# Patient Record
Sex: MALE | Race: WHITE | NOT HISPANIC OR LATINO | URBAN - METROPOLITAN AREA
[De-identification: names, ages, dates, MRNs, and addresses within clinical notes are randomized per-mention and may not be internally consistent; named-entity substitution may affect disease eponyms.]

---

## 2021-04-13 ENCOUNTER — HOSPITAL ENCOUNTER (OUTPATIENT)
Dept: VACCINE CLINIC | Facility: HOSPITAL | Age: 31
Discharge: HOME | End: 2021-04-13
Attending: INTERNAL MEDICINE

## 2021-05-04 ENCOUNTER — HOSPITAL ENCOUNTER (OUTPATIENT)
Dept: VACCINE CLINIC | Facility: HOSPITAL | Age: 31
Discharge: HOME | End: 2021-05-04
Attending: INTERNAL MEDICINE

## 2023-04-13 ENCOUNTER — OFFICE VISIT (OUTPATIENT)
Dept: FAMILY MEDICINE CLINIC | Facility: CLINIC | Age: 33
End: 2023-04-13
Payer: COMMERCIAL

## 2023-04-13 VITALS
DIASTOLIC BLOOD PRESSURE: 86 MMHG | HEIGHT: 67 IN | OXYGEN SATURATION: 99 % | BODY MASS INDEX: 32.02 KG/M2 | TEMPERATURE: 97.8 F | HEART RATE: 87 BPM | WEIGHT: 204 LBS | SYSTOLIC BLOOD PRESSURE: 131 MMHG

## 2023-04-13 DIAGNOSIS — Z11.59 NEED FOR HEPATITIS C SCREENING TEST: ICD-10-CM

## 2023-04-13 DIAGNOSIS — Z00.00 ENCOUNTER FOR MEDICAL EXAMINATION TO ESTABLISH CARE: Primary | ICD-10-CM

## 2023-04-13 DIAGNOSIS — F98.8 ATTENTION DEFICIT DISORDER (ADD) WITHOUT HYPERACTIVITY: ICD-10-CM

## 2023-04-13 LAB
ALBUMIN SERPL-MCNC: 5.3 G/DL (ref 3.5–5.2)
ALBUMIN/GLOB SERPL: 2.2 G/DL
ALP SERPL-CCNC: 52 U/L (ref 39–117)
ALT SERPL W P-5'-P-CCNC: 19 U/L (ref 1–41)
AMPHET+METHAMPHET UR QL: NEGATIVE
AMPHETAMINE INTERNAL CONTROL: ABNORMAL
AMPHETAMINES UR QL: NEGATIVE
ANION GAP SERPL CALCULATED.3IONS-SCNC: 10 MMOL/L (ref 5–15)
AST SERPL-CCNC: 16 U/L (ref 1–40)
BARBITURATE INTERNAL CONTROL: ABNORMAL
BARBITURATES UR QL SCN: NEGATIVE
BASOPHILS # BLD AUTO: 0.05 10*3/MM3 (ref 0–0.2)
BASOPHILS NFR BLD AUTO: 0.9 % (ref 0–1.5)
BENZODIAZ UR QL SCN: NEGATIVE
BENZODIAZEPINE INTERNAL CONTROL: ABNORMAL
BILIRUB SERPL-MCNC: 0.9 MG/DL (ref 0–1.2)
BUN SERPL-MCNC: 20 MG/DL (ref 6–20)
BUN/CREAT SERPL: 22 (ref 7–25)
BUPRENORPHINE INTERNAL CONTROL: ABNORMAL
BUPRENORPHINE SERPL-MCNC: NEGATIVE NG/ML
CALCIUM SPEC-SCNC: 10.1 MG/DL (ref 8.6–10.5)
CANNABINOIDS SERPL QL: POSITIVE
CHLORIDE SERPL-SCNC: 104 MMOL/L (ref 98–107)
CHOLEST SERPL-MCNC: 173 MG/DL (ref 0–200)
CO2 SERPL-SCNC: 27 MMOL/L (ref 22–29)
COCAINE INTERNAL CONTROL: ABNORMAL
COCAINE UR QL: NEGATIVE
CREAT SERPL-MCNC: 0.91 MG/DL (ref 0.76–1.27)
DEPRECATED RDW RBC AUTO: 35.9 FL (ref 37–54)
EGFRCR SERPLBLD CKD-EPI 2021: 113.4 ML/MIN/1.73
EOSINOPHIL # BLD AUTO: 0.11 10*3/MM3 (ref 0–0.4)
EOSINOPHIL NFR BLD AUTO: 2.1 % (ref 0.3–6.2)
ERYTHROCYTE [DISTWIDTH] IN BLOOD BY AUTOMATED COUNT: 12.2 % (ref 12.3–15.4)
EXPIRATION DATE: ABNORMAL
GLOBULIN UR ELPH-MCNC: 2.4 GM/DL
GLUCOSE SERPL-MCNC: 93 MG/DL (ref 65–99)
HCT VFR BLD AUTO: 43.2 % (ref 37.5–51)
HCV AB SER DONR QL: NORMAL
HDLC SERPL-MCNC: 47 MG/DL (ref 40–60)
HGB BLD-MCNC: 15.4 G/DL (ref 13–17.7)
IMM GRANULOCYTES # BLD AUTO: 0.01 10*3/MM3 (ref 0–0.05)
IMM GRANULOCYTES NFR BLD AUTO: 0.2 % (ref 0–0.5)
LDLC SERPL CALC-MCNC: 116 MG/DL (ref 0–100)
LDLC/HDLC SERPL: 2.46 {RATIO}
LYMPHOCYTES # BLD AUTO: 2.15 10*3/MM3 (ref 0.7–3.1)
LYMPHOCYTES NFR BLD AUTO: 40.7 % (ref 19.6–45.3)
Lab: ABNORMAL
MCH RBC QN AUTO: 29.1 PG (ref 26.6–33)
MCHC RBC AUTO-ENTMCNC: 35.6 G/DL (ref 31.5–35.7)
MCV RBC AUTO: 81.5 FL (ref 79–97)
MDMA (ECSTASY) INTERNAL CONTROL: ABNORMAL
MDMA UR QL SCN: NEGATIVE
METHADONE INTERNAL CONTROL: ABNORMAL
METHADONE UR QL SCN: NEGATIVE
METHAMPHETAMINE INTERNAL CONTROL: ABNORMAL
MONOCYTES # BLD AUTO: 0.38 10*3/MM3 (ref 0.1–0.9)
MONOCYTES NFR BLD AUTO: 7.2 % (ref 5–12)
NEUTROPHILS NFR BLD AUTO: 2.58 10*3/MM3 (ref 1.7–7)
NEUTROPHILS NFR BLD AUTO: 48.9 % (ref 42.7–76)
NRBC BLD AUTO-RTO: 0 /100 WBC (ref 0–0.2)
OPIATES INTERNAL CONTROL: ABNORMAL
OPIATES UR QL: NEGATIVE
OXYCODONE INTERNAL CONTROL: ABNORMAL
OXYCODONE UR QL SCN: NEGATIVE
PCP UR QL SCN: NEGATIVE
PHENCYCLIDINE INTERNAL CONTROL: ABNORMAL
PLATELET # BLD AUTO: 212 10*3/MM3 (ref 140–450)
PMV BLD AUTO: 10.3 FL (ref 6–12)
POTASSIUM SERPL-SCNC: 4.3 MMOL/L (ref 3.5–5.2)
PROT SERPL-MCNC: 7.7 G/DL (ref 6–8.5)
RBC # BLD AUTO: 5.3 10*6/MM3 (ref 4.14–5.8)
SODIUM SERPL-SCNC: 141 MMOL/L (ref 136–145)
THC INTERNAL CONTROL: ABNORMAL
TRIGL SERPL-MCNC: 51 MG/DL (ref 0–150)
TSH SERPL DL<=0.05 MIU/L-ACNC: 1.16 UIU/ML (ref 0.27–4.2)
VLDLC SERPL-MCNC: 10 MG/DL (ref 5–40)
WBC NRBC COR # BLD: 5.28 10*3/MM3 (ref 3.4–10.8)

## 2023-04-13 PROCEDURE — 85025 COMPLETE CBC W/AUTO DIFF WBC: CPT | Performed by: FAMILY MEDICINE

## 2023-04-13 PROCEDURE — 80053 COMPREHEN METABOLIC PANEL: CPT | Performed by: FAMILY MEDICINE

## 2023-04-13 PROCEDURE — 86803 HEPATITIS C AB TEST: CPT | Performed by: FAMILY MEDICINE

## 2023-04-13 PROCEDURE — 84443 ASSAY THYROID STIM HORMONE: CPT | Performed by: FAMILY MEDICINE

## 2023-04-13 PROCEDURE — 80061 LIPID PANEL: CPT | Performed by: FAMILY MEDICINE

## 2023-04-13 NOTE — ASSESSMENT & PLAN NOTE
He has multiple signs and symptoms of ADD.  Even the symptoms were Clarence go back to his high school days.  Unfortunately they have now persisted into adulthood.  We will start with a trial of medication.

## 2023-04-13 NOTE — PROGRESS NOTES
Chief Complaint   Patient presents with   • Establish Care        Subjective     David Hernandez  has no past medical history on file.    Establish care- he is an otherwise healthy young man without any chronic persistent medical problems.  He did is concerned about having attention deficit disorder.  Even going back to high school he had difficulties with focus and concentration.  Today he struggles with initiating and finishing projects.  He is describes himself as being an organized and frequently loses or misplaces things.      PHQ-2 Depression Screening  Little interest or pleasure in doing things?     Feeling down, depressed, or hopeless?     PHQ-2 Total Score     PHQ-9 Depression Screening  Little interest or pleasure in doing things?     Feeling down, depressed, or hopeless?     Trouble falling or staying asleep, or sleeping too much?     Feeling tired or having little energy?     Poor appetite or overeating?     Feeling bad about yourself - or that you are a failure or have let yourself or your family down?     Trouble concentrating on things, such as reading the newspaper or watching television?     Moving or speaking so slowly that other people could have noticed? Or the opposite - being so fidgety or restless that you have been moving around a lot more than usual?     Thoughts that you would be better off dead, or of hurting yourself in some way?     PHQ-9 Total Score     If you checked off any problems, how difficult have these problems made it for you to do your work, take care of things at home, or get along with other people?       No Known Allergies    Prior to Admission medications    Not on File        Patient Active Problem List   Diagnosis   • Encounter for medical examination to establish care   • Attention deficit disorder (ADD) without hyperactivity        Past Surgical History:   Procedure Laterality Date   • CLAVICLE SURGERY     • ELBOW PROCEDURE Right    • NASAL SEPTUM SURGERY Bilateral   "      Social History     Socioeconomic History   • Marital status:    Tobacco Use   • Smoking status: Former     Types: Cigarettes   • Smokeless tobacco: Never   Vaping Use   • Vaping Use: Never used       Family History   Problem Relation Age of Onset   • Cancer Mother    • Diabetes Father    • Cancer Father    • Cancer Maternal Grandfather    • Cancer Paternal Grandfather        Family history, surgical history, past medical history, Allergies and meds reviewed with patient today and updated in [facility] EMR.     ROS:  Review of Systems   Constitutional: Negative for fatigue.   HENT: Negative for congestion, postnasal drip and rhinorrhea.    Eyes: Negative for blurred vision and visual disturbance.   Respiratory: Negative for cough, chest tightness, shortness of breath and wheezing.    Cardiovascular: Negative for chest pain and palpitations.   Gastrointestinal: Negative for constipation and diarrhea.   Allergic/Immunologic: Negative for environmental allergies.   Neurological: Negative for headache.   Psychiatric/Behavioral: Positive for decreased concentration and stress. Negative for depressed mood. The patient is not nervous/anxious.        OBJECTIVE:  Vitals:    04/13/23 1002   BP: 131/86   BP Location: Left arm   Patient Position: Sitting   Pulse: 87   Temp: 97.8 °F (36.6 °C)   SpO2: 99%   Weight: 92.5 kg (204 lb)   Height: 170.2 cm (67\")     No results found.   Body mass index is 31.95 kg/m².  No LMP for male patient.    Physical Exam  Vitals and nursing note reviewed.   Constitutional:       General: He is not in acute distress.     Appearance: Normal appearance. He is normal weight.   HENT:      Head: Normocephalic.      Right Ear: Tympanic membrane, ear canal and external ear normal.      Left Ear: Tympanic membrane, ear canal and external ear normal.      Nose: Nose normal.      Mouth/Throat:      Mouth: Mucous membranes are moist.      Pharynx: Oropharynx is clear.   Eyes:      General: No scleral " icterus.     Conjunctiva/sclera: Conjunctivae normal.      Pupils: Pupils are equal, round, and reactive to light.   Cardiovascular:      Rate and Rhythm: Normal rate and regular rhythm.      Pulses: Normal pulses.      Heart sounds: Normal heart sounds. No murmur heard.  Pulmonary:      Effort: Pulmonary effort is normal.      Breath sounds: Normal breath sounds. No wheezing, rhonchi or rales.   Musculoskeletal:      Cervical back: Neck supple. No rigidity or tenderness.   Lymphadenopathy:      Cervical: No cervical adenopathy.   Skin:     General: Skin is warm and dry.      Coloration: Skin is not jaundiced.      Findings: No rash.   Neurological:      General: No focal deficit present.      Mental Status: He is alert and oriented to person, place, and time.   Psychiatric:         Mood and Affect: Mood normal.         Thought Content: Thought content normal.         Judgment: Judgment normal.         Procedures    No visits with results within 30 Day(s) from this visit.   Latest known visit with results is:   No results found for any previous visit.       ASSESSMENT/ PLAN:    Diagnoses and all orders for this visit:    1. Encounter for medical examination to establish care (Primary)  Assessment & Plan:  He presents today to establish care.  He is otherwise healthy.  We will just get some routine labs.    Orders:  -     Comprehensive Metabolic Panel  -     CBC & Differential  -     TSH  -     Lipid Panel    2. Attention deficit disorder (ADD) without hyperactivity  Assessment & Plan:  He has multiple signs and symptoms of ADD.  Even the symptoms were Clarence go back to his high school days.  Unfortunately they have now persisted into adulthood.  We will start with a trial of medication.    Orders:  -     POC Urine Drug Screen [facility]  -     lisdexamfetamine (Vyvanse) 20 MG capsule; Take 1 capsule by mouth Every Morning for 30 days  Dispense: 30 capsule; Refill: 0      Orders Placed Today:     New Medications  Ordered This Visit   Medications   • lisdexamfetamine (Vyvanse) 20 MG capsule     Sig: Take 1 capsule by mouth Every Morning for 30 days     Dispense:  30 capsule     Refill:  0        Management Plan:     An After Visit Summary was printed and given to the patient at discharge.    Follow-up: Return in about 4 weeks (around 5/11/2023) for Recheck.    Rolo Vance DO 4/13/2023 11:23 EDT  This note was electronically signed.

## 2023-04-13 NOTE — ASSESSMENT & PLAN NOTE
He presents today to establish care.  He is otherwise healthy.  We will just get some routine labs.

## 2023-04-19 ENCOUNTER — PATIENT ROUNDING (BHMG ONLY) (OUTPATIENT)
Dept: FAMILY MEDICINE CLINIC | Facility: CLINIC | Age: 33
End: 2023-04-19
Payer: COMMERCIAL

## 2023-04-19 NOTE — PROGRESS NOTES
A [employer] MESSAGE HAS BEEN SENT TO THE PATIENT FOR PATIENT ROUNDING WITH [facility] [place]

## 2023-05-16 ENCOUNTER — OFFICE VISIT (OUTPATIENT)
Dept: FAMILY MEDICINE CLINIC | Facility: CLINIC | Age: 33
End: 2023-05-16
Payer: COMMERCIAL

## 2023-05-16 VITALS
WEIGHT: 209 LBS | HEIGHT: 67 IN | HEART RATE: 78 BPM | TEMPERATURE: 97.5 F | SYSTOLIC BLOOD PRESSURE: 136 MMHG | DIASTOLIC BLOOD PRESSURE: 87 MMHG | BODY MASS INDEX: 32.8 KG/M2 | OXYGEN SATURATION: 98 %

## 2023-05-16 DIAGNOSIS — F98.8 ATTENTION DEFICIT DISORDER (ADD) WITHOUT HYPERACTIVITY: Primary | ICD-10-CM

## 2023-05-16 PROCEDURE — 99213 OFFICE O/P EST LOW 20 MIN: CPT | Performed by: FAMILY MEDICINE

## 2023-05-16 NOTE — ASSESSMENT & PLAN NOTE
He is doing somewhat better but still feels there is a lot more room for improvement.  We will increase his Vyvanse up further.

## 2023-05-16 NOTE — PROGRESS NOTES
Chief Complaint   Patient presents with   • Follow-up     1 month    • ADD        Subjective     David Hernandez  has no past medical history on file.    ADD-he has noticed some improvement with the Vyvanse on a daily basis with his focus attention and concentration.  He states it was more noticeable the first couple weeks but seem to have waned the last couple weeks.  Initially had some difficulty sleeping but that has since resolved.  Also he noticed a decrease in his appetite the first couple weeks but that seemed to have resolved as well.      PHQ-2 Depression Screening  Little interest or pleasure in doing things?     Feeling down, depressed, or hopeless?     PHQ-2 Total Score     PHQ-9 Depression Screening  Little interest or pleasure in doing things?     Feeling down, depressed, or hopeless?     Trouble falling or staying asleep, or sleeping too much?     Feeling tired or having little energy?     Poor appetite or overeating?     Feeling bad about yourself - or that you are a failure or have let yourself or your family down?     Trouble concentrating on things, such as reading the newspaper or watching television?     Moving or speaking so slowly that other people could have noticed? Or the opposite - being so fidgety or restless that you have been moving around a lot more than usual?     Thoughts that you would be better off dead, or of hurting yourself in some way?     PHQ-9 Total Score     If you checked off any problems, how difficult have these problems made it for you to do your work, take care of things at home, or get along with other people?       No Known Allergies    Prior to Admission medications    Medication Sig Start Date End Date Taking? Authorizing Provider   lisdexamfetamine (Vyvanse) 20 MG capsule Take 1 capsule by mouth Every Morning for 30 days 4/13/23 5/16/23 Yes Rolo Vance, DO        Patient Active Problem List   Diagnosis   • Encounter for medical examination to establish  "care   • Attention deficit disorder (ADD) without hyperactivity        Past Surgical History:   Procedure Laterality Date   • CLAVICLE SURGERY     • ELBOW PROCEDURE Right    • NASAL SEPTUM SURGERY Bilateral        Social History     Socioeconomic History   • Marital status:    Tobacco Use   • Smoking status: Former     Types: Cigarettes   • Smokeless tobacco: Never   Vaping Use   • Vaping Use: Never used       Family History   Problem Relation Age of Onset   • Cancer Mother    • Diabetes Father    • Cancer Father    • Cancer Maternal Grandfather    • Cancer Paternal Grandfather        Family history, surgical history, past medical history, Allergies and meds reviewed with patient today and updated in [facility] EMR.     ROS:  Review of Systems   Constitutional: Negative for appetite change and fatigue.   Neurological: Negative for tremors.   Psychiatric/Behavioral: Positive for decreased concentration. The patient is not nervous/anxious.        OBJECTIVE:  Vitals:    05/16/23 0818   BP: 136/87   BP Location: Left arm   Patient Position: Sitting   Pulse: 78   Temp: 97.5 °F (36.4 °C)   SpO2: 98%   Weight: 94.8 kg (209 lb)   Height: 170.2 cm (67\")     No results found.   Body mass index is 32.73 kg/m².  No LMP for male patient.    Physical Exam  Vitals and nursing note reviewed.   Constitutional:       General: He is not in acute distress.     Appearance: Normal appearance. He is normal weight.   HENT:      Head: Normocephalic.   Cardiovascular:      Rate and Rhythm: Normal rate and regular rhythm.      Heart sounds: Normal heart sounds. No murmur heard.  Pulmonary:      Effort: Pulmonary effort is normal.      Breath sounds: Normal breath sounds. No wheezing.   Neurological:      Mental Status: He is alert and oriented to person, place, and time.   Psychiatric:         Mood and Affect: Mood normal.         Behavior: Behavior normal.         Thought Content: Thought content normal.         Judgment: Judgment normal. "         Procedures    No visits with results within 30 Day(s) from this visit.   Latest known visit with results is:   Office Visit on 04/13/2023   Component Date Value Ref Range Status   • Glucose 04/13/2023 93  65 - 99 mg/dL Final   • BUN 04/13/2023 20  6 - 20 mg/dL Final   • Creatinine 04/13/2023 0.91  0.76 - 1.27 mg/dL Final   • Sodium 04/13/2023 141  136 - 145 mmol/L Final   • Potassium 04/13/2023 4.3  3.5 - 5.2 mmol/L Final   • Chloride 04/13/2023 104  98 - 107 mmol/L Final   • CO2 04/13/2023 27.0  22.0 - 29.0 mmol/L Final   • Calcium 04/13/2023 10.1  8.6 - 10.5 mg/dL Final   • Total Protein 04/13/2023 7.7  6.0 - 8.5 g/dL Final   • Albumin 04/13/2023 5.3 (H)  3.5 - 5.2 g/dL Final   • ALT (SGPT) 04/13/2023 19  1 - 41 U/L Final   • AST (SGOT) 04/13/2023 16  1 - 40 U/L Final   • Alkaline Phosphatase 04/13/2023 52  39 - 117 U/L Final   • Total Bilirubin 04/13/2023 0.9  0.0 - 1.2 mg/dL Final   • Globulin 04/13/2023 2.4  gm/dL Final   • A/G Ratio 04/13/2023 2.2  g/dL Final   • BUN/Creatinine Ratio 04/13/2023 22.0  7.0 - 25.0 Final   • Anion Gap 04/13/2023 10.0  5.0 - 15.0 mmol/L Final   • eGFR 04/13/2023 113.4  >60.0 mL/min/1.73 Final   • TSH 04/13/2023 1.160  0.270 - 4.200 uIU/mL Final   • Total Cholesterol 04/13/2023 173  0 - 200 mg/dL Final   • Triglycerides 04/13/2023 51  0 - 150 mg/dL Final   • HDL Cholesterol 04/13/2023 47  40 - 60 mg/dL Final   • LDL Cholesterol  04/13/2023 116 (H)  0 - 100 mg/dL Final   • VLDL Cholesterol 04/13/2023 10  5 - 40 mg/dL Final   • LDL/HDL Ratio 04/13/2023 2.46   Final   • Amphetamine Screen, Urine 04/13/2023 Negative  Negative Final   • AMP INTERNAL CONTROL 04/13/2023 Passed  Passed Final   • Barbiturates Screen, Urine 04/13/2023 Negative  Negative Final   • BARBITURATE INTERNAL CONTROL 04/13/2023 Passed  Passed Final   • Buprenorphine, Screen, Urine 04/13/2023 Negative  Negative Final   • BUPRENORPHINE INTERNAL CONTROL 04/13/2023 Passed  Passed Final   • Benzodiazepine Screen,  Urine 04/13/2023 Negative  Negative Final   • BENZODIAZEPINE INTERNAL CONTROL 04/13/2023 Passed  Passed Final   • Cocaine Screen, Urine 04/13/2023 Negative  Negative Final   • COCAINE INTERNAL CONTROL 04/13/2023 Passed  Passed Final   • MDMA (ECSTASY) 04/13/2023 Negative  Negative Final   • MDMA (ECSTASY) INTERNAL CONTROL 04/13/2023 Passed  Passed Final   • Methamphetamine, Ur 04/13/2023 Negative  Negative Final   • METHAMPHETAMINE INTERNAL CONTROL 04/13/2023 Passed  Passed Final   • Methadone Screen, Urine 04/13/2023 Negative  Negative Final   • METHADONE INTERNAL CONTROL 04/13/2023 Passed  Passed Final   • Opiate Screen 04/13/2023 Negative  Negative Final   • OPIATES INTERNAL CONTROL 04/13/2023 Passed  Passed Final   • Oxycodone Screen, Urine 04/13/2023 Negative  Negative Final   • OXYCODONE INTERNAL CONTROL 04/13/2023 Passed  Passed Final   • Phencyclidine (PCP), Urine 04/13/2023 Negative  Negative Final   • PHENCYCLIDINE INTERNAL CONTROL 04/13/2023 Passed  Passed Final   • THC, Screen, Urine 04/13/2023 Positive (A)  Negative Final   • THC INTERNAL CONTROL 04/13/2023 Passed  Passed Final   • Lot Number 04/13/2023 [id number]   Final   • Expiration Date 04/13/2023 08/08/2024   Final   • Hepatitis C Ab 04/13/2023 Non-Reactive  Non-Reactive Final   • WBC 04/13/2023 5.28  3.40 - 10.80 10*3/mm3 Final   • RBC 04/13/2023 5.30  4.14 - 5.80 10*6/mm3 Final   • Hemoglobin 04/13/2023 15.4  13.0 - 17.7 g/dL Final   • Hematocrit 04/13/2023 43.2  37.5 - 51.0 % Final   • MCV 04/13/2023 81.5  79.0 - 97.0 fL Final   • MCH 04/13/2023 29.1  26.6 - 33.0 pg Final   • MCHC 04/13/2023 35.6  31.5 - 35.7 g/dL Final   • RDW 04/13/2023 12.2 (L)  12.3 - 15.4 % Final   • RDW-SD 04/13/2023 35.9 (L)  37.0 - 54.0 fl Final   • MPV 04/13/2023 10.3  6.0 - 12.0 fL Final   • Platelets 04/13/2023 212  140 - 450 10*3/mm3 Final   • Neutrophil % 04/13/2023 48.9  42.7 - 76.0 % Final   • Lymphocyte % 04/13/2023 40.7  19.6 - 45.3 % Final   • Monocyte %  04/13/2023 7.2  5.0 - 12.0 % Final   • Eosinophil % 04/13/2023 2.1  0.3 - 6.2 % Final   • Basophil % 04/13/2023 0.9  0.0 - 1.5 % Final   • Immature Grans % 04/13/2023 0.2  0.0 - 0.5 % Final   • Neutrophils, Absolute 04/13/2023 2.58  1.70 - 7.00 10*3/mm3 Final   • Lymphocytes, Absolute 04/13/2023 2.15  0.70 - 3.10 10*3/mm3 Final   • Monocytes, Absolute 04/13/2023 0.38  0.10 - 0.90 10*3/mm3 Final   • Eosinophils, Absolute 04/13/2023 0.11  0.00 - 0.40 10*3/mm3 Final   • Basophils, Absolute 04/13/2023 0.05  0.00 - 0.20 10*3/mm3 Final   • Immature Grans, Absolute 04/13/2023 0.01  0.00 - 0.05 10*3/mm3 Final   • nRBC 04/13/2023 0.0  0.0 - 0.2 /100 WBC Final       ASSESSMENT/ PLAN:    Diagnoses and all orders for this visit:    1. Attention deficit disorder (ADD) without hyperactivity (Primary)  Assessment & Plan:  He is doing somewhat better but still feels there is a lot more room for improvement.  We will increase his Vyvanse up further.    Orders:  -     lisdexamfetamine (Vyvanse) 30 MG capsule; Take 1 capsule by mouth Every Morning for 30 days  Dispense: 30 capsule; Refill: 0      Orders Placed Today:     New Medications Ordered This Visit   Medications   • lisdexamfetamine (Vyvanse) 30 MG capsule     Sig: Take 1 capsule by mouth Every Morning for 30 days     Dispense:  30 capsule     Refill:  0        Management Plan:     An After Visit Summary was printed and given to the patient at discharge.    Follow-up: Return in about 4 weeks (around 6/13/2023) for Recheck.    Rolo Vance DO 5/16/2023 08:29 EDT  This note was electronically signed.  Answers for HPI/ROS submitted by the patient on 5/9/2023  What is the primary reason for your visit?: Other  Please describe your symptoms.: Follow up on ADD medication  Have you had these symptoms before?: Yes  How long have you been having these symptoms?: Greater than 2 weeks  Please list any medications you are currently taking for this condition.: Billie  20mg  Please describe any probable cause for these symptoms. : ADD

## 2023-06-14 DIAGNOSIS — F98.8 ATTENTION DEFICIT DISORDER (ADD) WITHOUT HYPERACTIVITY: ICD-10-CM

## 2023-06-27 PROBLEM — J02.9 SORE THROAT: Status: ACTIVE | Noted: 2023-06-27

## 2023-07-28 DIAGNOSIS — F98.8 ATTENTION DEFICIT DISORDER (ADD) WITHOUT HYPERACTIVITY: ICD-10-CM

## 2023-08-29 ENCOUNTER — OFFICE VISIT (OUTPATIENT)
Dept: FAMILY MEDICINE CLINIC | Facility: CLINIC | Age: 33
End: 2023-08-29
Payer: COMMERCIAL

## 2023-08-29 VITALS
DIASTOLIC BLOOD PRESSURE: 82 MMHG | HEART RATE: 105 BPM | TEMPERATURE: 97.2 F | OXYGEN SATURATION: 98 % | HEIGHT: 67 IN | SYSTOLIC BLOOD PRESSURE: 142 MMHG | WEIGHT: 202 LBS | BODY MASS INDEX: 31.71 KG/M2

## 2023-08-29 DIAGNOSIS — F98.8 ATTENTION DEFICIT DISORDER (ADD) WITHOUT HYPERACTIVITY: ICD-10-CM

## 2023-08-29 PROCEDURE — 99213 OFFICE O/P EST LOW 20 MIN: CPT | Performed by: FAMILY MEDICINE

## 2023-08-29 RX ORDER — METHYLPHENIDATE HYDROCHLORIDE 10 MG/1
TABLET ORAL
Qty: 15 TABLET | Refills: 0 | Status: SHIPPED | OUTPATIENT
Start: 2023-08-29

## 2023-08-29 NOTE — PROGRESS NOTES
Chief Complaint   Patient presents with    Follow-up     2 months f/u    Med Refill     Medication pending         Subjective     David Hernandez  has no past medical history on file.    ADD-he states he is doing well with the Vyvanse 40 mg daily.  He noticed that his focus attention and concentration is good.  The only problem is he does notice that by 5:00 it is seem to have worn off.  He states he has typically at least 2 late nights a week up until about 10 or 1030 which then he does have difficulty staying focused and not distracted.      PHQ-2 Depression Screening  Little interest or pleasure in doing things?     Feeling down, depressed, or hopeless?     PHQ-2 Total Score     PHQ-9 Depression Screening  Little interest or pleasure in doing things?     Feeling down, depressed, or hopeless?     Trouble falling or staying asleep, or sleeping too much?     Feeling tired or having little energy?     Poor appetite or overeating?     Feeling bad about yourself - or that you are a failure or have let yourself or your family down?     Trouble concentrating on things, such as reading the newspaper or watching television?     Moving or speaking so slowly that other people could have noticed? Or the opposite - being so fidgety or restless that you have been moving around a lot more than usual?     Thoughts that you would be better off dead, or of hurting yourself in some way?     PHQ-9 Total Score     If you checked off any problems, how difficult have these problems made it for you to do your work, take care of things at home, or get along with other people?       No Known Allergies    Prior to Admission medications    Medication Sig Start Date End Date Taking? Authorizing Provider   lisdexamfetamine (Vyvanse) 40 MG capsule Take 1 capsule by mouth Every Morning 7/28/23  Yes Rolo Vance, DO        Patient Active Problem List   Diagnosis    Encounter for medical examination to establish care    Attention deficit  "disorder (ADD) without hyperactivity    Sore throat        Past Surgical History:   Procedure Laterality Date    CLAVICLE SURGERY      ELBOW PROCEDURE Right     NASAL SEPTUM SURGERY Bilateral        Social History     Socioeconomic History    Marital status:    Tobacco Use    Smoking status: Former     Types: Cigarettes    Smokeless tobacco: Never   Vaping Use    Vaping Use: Never used       Family History   Problem Relation Age of Onset    Cancer Mother     Diabetes Father     Cancer Father     Cancer Maternal Grandfather     Cancer Paternal Grandfather        Family history, surgical history, past medical history, Allergies and meds reviewed with patient today and updated in [facility] EMR.     ROS:  Review of Systems   Constitutional:  Negative for appetite change and unexpected weight loss.   Cardiovascular:  Negative for palpitations.   Neurological:  Negative for tremors.   Psychiatric/Behavioral:  Negative for decreased concentration. The patient is not nervous/anxious.      OBJECTIVE:  Vitals:    08/29/23 1613   BP: 142/82   BP Location: Left arm   Patient Position: Sitting   Pulse: 105   Temp: 97.2 øF (36.2 øC)   TempSrc: Temporal   SpO2: 98%   Weight: 91.6 kg (202 lb)   Height: 170.2 cm (67\")     No results found.   Body mass index is 31.64 kg/mý.  No LMP for male patient.    Physical Exam  Vitals and nursing note reviewed.   Constitutional:       General: He is not in acute distress.     Appearance: Normal appearance. He is normal weight.   HENT:      Head: Normocephalic.   Cardiovascular:      Rate and Rhythm: Normal rate and regular rhythm.      Heart sounds: Normal heart sounds. No murmur heard.  Pulmonary:      Effort: Pulmonary effort is normal.      Breath sounds: Normal breath sounds. No wheezing.   Neurological:      Mental Status: He is alert and oriented to person, place, and time.   Psychiatric:         Mood and Affect: Mood normal.         Thought Content: Thought content normal.         " Judgment: Judgment normal.       Procedures    No visits with results within 30 Day(s) from this visit.   Latest known visit with results is:   Office Visit on 06/27/2023   Component Date Value Ref Range Status    Rapid Strep A Screen 06/27/2023 Negative  Negative, VALID, INVALID, Not Performed Final    Internal Control 06/27/2023 Passed  Passed Final    Lot Number 06/27/2023 [id number]   Final    Expiration Date 06/27/2023 01/24/2025   Final       ASSESSMENT/ PLAN:    Diagnoses and all orders for this visit:    1. Attention deficit disorder (ADD) without hyperactivity  Assessment & Plan:  He does well with his Vyvanse on a daily basis up until about 5 PM.  On his very late nights we will add a short acting methylphenidate to carry him through the night.    Orders:  -     lisdexamfetamine (Vyvanse) 40 MG capsule; Take 1 capsule by mouth Every Morning  Dispense: 30 capsule; Refill: 0  -     methylphenidate (RITALIN) 10 MG tablet; Take 1 tablet at 4 PM as needed.  Dispense: 15 tablet; Refill: 0        Orders Placed Today:     New Medications Ordered This Visit   Medications    lisdexamfetamine (Vyvanse) 40 MG capsule     Sig: Take 1 capsule by mouth Every Morning     Dispense:  30 capsule     Refill:  0    methylphenidate (RITALIN) 10 MG tablet     Sig: Take 1 tablet at 4 PM as needed.     Dispense:  15 tablet     Refill:  0        Management Plan:     An After Visit Summary was printed and given to the patient at discharge.    Follow-up: Return in about 3 months (around 11/29/2023) for Recheck.    Rolo Vance DO 8/29/2023 16:51 EDT  This note was electronically signed.

## 2023-08-29 NOTE — ASSESSMENT & PLAN NOTE
He does well with his Vyvanse on a daily basis up until about 5 PM.  On his very late nights we will add a short acting methylphenidate to carry him through the night.

## 2023-10-04 DIAGNOSIS — F98.8 ATTENTION DEFICIT DISORDER (ADD) WITHOUT HYPERACTIVITY: ICD-10-CM

## 2023-10-04 RX ORDER — LISDEXAMFETAMINE DIMESYLATE CAPSULES 40 MG/1
40 CAPSULE ORAL EVERY MORNING
Qty: 30 CAPSULE | Refills: 0 | Status: SHIPPED | OUTPATIENT
Start: 2023-10-04

## 2023-10-30 DIAGNOSIS — F98.8 ATTENTION DEFICIT DISORDER (ADD) WITHOUT HYPERACTIVITY: ICD-10-CM

## 2023-10-30 RX ORDER — METHYLPHENIDATE HYDROCHLORIDE 10 MG/1
TABLET ORAL
Qty: 15 TABLET | Refills: 0 | Status: SHIPPED | OUTPATIENT
Start: 2023-10-30

## 2023-11-03 DIAGNOSIS — F98.8 ATTENTION DEFICIT DISORDER (ADD) WITHOUT HYPERACTIVITY: ICD-10-CM

## 2023-11-06 RX ORDER — LISDEXAMFETAMINE DIMESYLATE CAPSULES 40 MG/1
40 CAPSULE ORAL EVERY MORNING
Qty: 30 CAPSULE | Refills: 0 | Status: SHIPPED | OUTPATIENT
Start: 2023-11-06

## 2023-12-04 DIAGNOSIS — F98.8 ATTENTION DEFICIT DISORDER (ADD) WITHOUT HYPERACTIVITY: ICD-10-CM

## 2023-12-05 RX ORDER — LISDEXAMFETAMINE DIMESYLATE CAPSULES 40 MG/1
40 CAPSULE ORAL EVERY MORNING
Qty: 30 CAPSULE | Refills: 0 | Status: SHIPPED | OUTPATIENT
Start: 2023-12-05

## 2023-12-15 ENCOUNTER — OFFICE VISIT (OUTPATIENT)
Dept: FAMILY MEDICINE CLINIC | Facility: CLINIC | Age: 33
End: 2023-12-15
Payer: COMMERCIAL

## 2023-12-15 VITALS
SYSTOLIC BLOOD PRESSURE: 146 MMHG | DIASTOLIC BLOOD PRESSURE: 87 MMHG | HEIGHT: 67 IN | TEMPERATURE: 97.8 F | OXYGEN SATURATION: 98 % | WEIGHT: 203 LBS | BODY MASS INDEX: 31.86 KG/M2 | HEART RATE: 92 BPM

## 2023-12-15 DIAGNOSIS — F98.8 ATTENTION DEFICIT DISORDER (ADD) WITHOUT HYPERACTIVITY: ICD-10-CM

## 2023-12-15 DIAGNOSIS — R03.0 ELEVATED BLOOD PRESSURE READING: Primary | ICD-10-CM

## 2023-12-15 PROCEDURE — 99214 OFFICE O/P EST MOD 30 MIN: CPT | Performed by: FAMILY MEDICINE

## 2023-12-15 RX ORDER — LISDEXAMFETAMINE DIMESYLATE CAPSULES 50 MG/1
50 CAPSULE ORAL EVERY MORNING
Qty: 30 CAPSULE | Refills: 0 | Status: SHIPPED | OUTPATIENT
Start: 2023-12-15

## 2023-12-15 NOTE — ASSESSMENT & PLAN NOTE
He is doing okay with his medication but still feels there is some additional room for improvement.  Will increase Vyvanse from 40 to 50 mg daily.

## 2023-12-15 NOTE — ASSESSMENT & PLAN NOTE
His blood pressure is a little elevated here as well as at home.  This is most likely due to his current work stress.  He hopes to have some resolution to this over the next 1 to 2 months.  In the meantime he will work on lifestyle changes of diet and exercise.  He will continue to monitor his blood pressure.  If it does not improve then we will rediscuss medication.

## 2023-12-15 NOTE — PROGRESS NOTES
Chief Complaint   Patient presents with    Follow-up     3 month     ADD        Subjective     David Hernandez  has no past medical history on file.    Elevated blood pressure-he has been checking his blood pressure at home.  He states his home blood pressure readings have been a little elevated too.  He notices that it is the weekends and he has taken time off from work and recheck his blood pressure will be improved.  He states this has also been a rather stressful week.    ADD- he does feel that the Vyvanse does seem to help with his ADD.  He does have many days though that he does not feel that it is adequate enough.  He does do the methylphenidate in the afternoon.  He states it does help but those effects are somewhat short-lived.        PHQ-2 Depression Screening  Little interest or pleasure in doing things?     Feeling down, depressed, or hopeless?     PHQ-2 Total Score     PHQ-9 Depression Screening  Little interest or pleasure in doing things?     Feeling down, depressed, or hopeless?     Trouble falling or staying asleep, or sleeping too much?     Feeling tired or having little energy?     Poor appetite or overeating?     Feeling bad about yourself - or that you are a failure or have let yourself or your family down?     Trouble concentrating on things, such as reading the newspaper or watching television?     Moving or speaking so slowly that other people could have noticed? Or the opposite - being so fidgety or restless that you have been moving around a lot more than usual?     Thoughts that you would be better off dead, or of hurting yourself in some way?     PHQ-9 Total Score     If you checked off any problems, how difficult have these problems made it for you to do your work, take care of things at home, or get along with other people?       No Known Allergies    Prior to Admission medications    Medication Sig Start Date End Date Taking? Authorizing Provider   lisdexamfetamine (Vyvanse) 40 MG capsule  "Take 1 capsule by mouth Every Morning 12/5/23  Yes Rolo Vance,    methylphenidate (RITALIN) 10 MG tablet Take 1 tablet at 4 PM as needed. 10/30/23  Yes Rolo Vance DO        Patient Active Problem List   Diagnosis    Encounter for medical examination to establish care    Attention deficit disorder (ADD) without hyperactivity    Sore throat    Elevated blood pressure reading        Past Surgical History:   Procedure Laterality Date    CLAVICLE SURGERY      ELBOW PROCEDURE Right     NASAL SEPTUM SURGERY Bilateral        Social History     Socioeconomic History    Marital status:    Tobacco Use    Smoking status: Former     Types: Cigarettes    Smokeless tobacco: Never   Vaping Use    Vaping Use: Never used       Family History   Problem Relation Age of Onset    Cancer Mother     Diabetes Father     Cancer Father     Cancer Maternal Grandfather     Cancer Paternal Grandfather        Family history, surgical history, past medical history, Allergies and meds reviewed with patient today and updated in [employer] EMR.     ROS:  Review of Systems   Constitutional:  Negative for fatigue.   Neurological:  Negative for headache.   Psychiatric/Behavioral:  Positive for decreased concentration and stress.        OBJECTIVE:  Vitals:    12/15/23 1430   BP: 146/87   BP Location: Left arm   Patient Position: Sitting   Pulse: 92   Temp: 97.8 °F (36.6 °C)   SpO2: 98%   Weight: 92.1 kg (203 lb)   Height: 170.2 cm (67\")     No results found.   Body mass index is 31.79 kg/m².  No LMP for male patient.    Physical Exam  Vitals and nursing note reviewed.   Constitutional:       General: He is not in acute distress.     Appearance: Normal appearance. He is normal weight.   HENT:      Head: Normocephalic.   Cardiovascular:      Rate and Rhythm: Normal rate and regular rhythm.      Heart sounds: Normal heart sounds. No murmur heard.  Pulmonary:      Effort: Pulmonary effort is normal.      Breath sounds: Normal " breath sounds. No wheezing.   Neurological:      Mental Status: He is alert and oriented to person, place, and time.   Psychiatric:         Mood and Affect: Mood normal.         Thought Content: Thought content normal.         Judgment: Judgment normal.         Procedures    No visits with results within 30 Day(s) from this visit.   Latest known visit with results is:   Office Visit on 06/27/2023   Component Date Value Ref Range Status    Rapid Strep A Screen 06/27/2023 Negative  Negative, VALID, INVALID, Not Performed Final    Internal Control 06/27/2023 Passed  Passed Final    Lot Number 06/27/2023 [id number]   Final    Expiration Date 06/27/2023 01/24/2025   Final       ASSESSMENT/ PLAN:    Diagnoses and all orders for this visit:    1. Elevated blood pressure reading (Primary)  Assessment & Plan:  His blood pressure is a little elevated here as well as at home.  This is most likely due to his current work stress.  He hopes to have some resolution to this over the next 1 to 2 months.  In the meantime he will work on lifestyle changes of diet and exercise.  He will continue to monitor his blood pressure.  If it does not improve then we will rediscuss medication.      2. Attention deficit disorder (ADD) without hyperactivity  Assessment & Plan:  He is doing okay with his medication but still feels there is some additional room for improvement.  Will increase Vyvanse from 40 to 50 mg daily.    Orders:  -     lisdexamfetamine (Vyvanse) 50 MG capsule; Take 1 capsule by mouth Every Morning  Dispense: 30 capsule; Refill: 0        BMI is >= 30 and <35. (Class 1 Obesity). The following options were offered after discussion;: exercise counseling/recommendations and nutrition counseling/recommendations      Orders Placed Today:     New Medications Ordered This Visit   Medications    lisdexamfetamine (Vyvanse) 50 MG capsule     Sig: Take 1 capsule by mouth Every Morning     Dispense:  30 capsule     Refill:  0        Management  Plan:     An After Visit Summary was printed and given to the patient at discharge.    Follow-up: Return in about 2 months (around 2/15/2024) for Recheck.    Rolo Vance DO 12/15/2023 15:05 EST  This note was electronically signed.

## 2023-12-21 DIAGNOSIS — F98.8 ATTENTION DEFICIT DISORDER (ADD) WITHOUT HYPERACTIVITY: ICD-10-CM

## 2023-12-22 RX ORDER — METHYLPHENIDATE HYDROCHLORIDE 10 MG/1
TABLET ORAL
Qty: 15 TABLET | Refills: 0 | Status: SHIPPED | OUTPATIENT
Start: 2023-12-22

## 2024-01-16 DIAGNOSIS — F98.8 ATTENTION DEFICIT DISORDER (ADD) WITHOUT HYPERACTIVITY: ICD-10-CM

## 2024-01-16 RX ORDER — LISDEXAMFETAMINE DIMESYLATE CAPSULES 50 MG/1
50 CAPSULE ORAL EVERY MORNING
Qty: 30 CAPSULE | Refills: 0 | Status: SHIPPED | OUTPATIENT
Start: 2024-01-16

## 2024-01-24 DIAGNOSIS — F98.8 ATTENTION DEFICIT DISORDER (ADD) WITHOUT HYPERACTIVITY: ICD-10-CM

## 2024-01-24 RX ORDER — METHYLPHENIDATE HYDROCHLORIDE 10 MG/1
TABLET ORAL
Qty: 15 TABLET | Refills: 0 | Status: SHIPPED | OUTPATIENT
Start: 2024-01-24

## 2024-02-14 DIAGNOSIS — F98.8 ATTENTION DEFICIT DISORDER (ADD) WITHOUT HYPERACTIVITY: ICD-10-CM

## 2024-02-14 RX ORDER — LISDEXAMFETAMINE DIMESYLATE CAPSULES 50 MG/1
50 CAPSULE ORAL EVERY MORNING
Qty: 30 CAPSULE | Refills: 0 | Status: SHIPPED | OUTPATIENT
Start: 2024-02-14

## 2024-02-16 ENCOUNTER — OFFICE VISIT (OUTPATIENT)
Dept: FAMILY MEDICINE CLINIC | Facility: CLINIC | Age: 34
End: 2024-02-16
Payer: COMMERCIAL

## 2024-02-16 VITALS
HEART RATE: 94 BPM | OXYGEN SATURATION: 98 % | HEIGHT: 67 IN | BODY MASS INDEX: 32.49 KG/M2 | DIASTOLIC BLOOD PRESSURE: 87 MMHG | TEMPERATURE: 98 F | SYSTOLIC BLOOD PRESSURE: 137 MMHG | WEIGHT: 207 LBS

## 2024-02-16 DIAGNOSIS — R03.0 ELEVATED BLOOD PRESSURE READING: ICD-10-CM

## 2024-02-16 DIAGNOSIS — F98.8 ATTENTION DEFICIT DISORDER (ADD) WITHOUT HYPERACTIVITY: Primary | ICD-10-CM

## 2024-02-16 PROCEDURE — 99213 OFFICE O/P EST LOW 20 MIN: CPT | Performed by: FAMILY MEDICINE

## 2024-03-14 DIAGNOSIS — F98.8 ATTENTION DEFICIT DISORDER (ADD) WITHOUT HYPERACTIVITY: ICD-10-CM

## 2024-03-14 RX ORDER — LISDEXAMFETAMINE DIMESYLATE CAPSULES 50 MG/1
50 CAPSULE ORAL EVERY MORNING
Qty: 30 CAPSULE | Refills: 0 | Status: SHIPPED | OUTPATIENT
Start: 2024-03-14

## 2024-04-16 DIAGNOSIS — F98.8 ATTENTION DEFICIT DISORDER (ADD) WITHOUT HYPERACTIVITY: ICD-10-CM

## 2024-04-16 RX ORDER — LISDEXAMFETAMINE DIMESYLATE CAPSULES 50 MG/1
50 CAPSULE ORAL EVERY MORNING
Qty: 30 CAPSULE | Refills: 0 | Status: SHIPPED | OUTPATIENT
Start: 2024-04-16

## 2024-04-16 RX ORDER — METHYLPHENIDATE HYDROCHLORIDE 10 MG/1
TABLET ORAL
Qty: 15 TABLET | Refills: 0 | Status: SHIPPED | OUTPATIENT
Start: 2024-04-16

## 2024-05-16 ENCOUNTER — OFFICE VISIT (OUTPATIENT)
Dept: FAMILY MEDICINE CLINIC | Facility: CLINIC | Age: 34
End: 2024-05-16
Payer: COMMERCIAL

## 2024-05-16 VITALS
TEMPERATURE: 97.6 F | OXYGEN SATURATION: 98 % | WEIGHT: 202 LBS | BODY MASS INDEX: 31.71 KG/M2 | SYSTOLIC BLOOD PRESSURE: 139 MMHG | HEIGHT: 67 IN | HEART RATE: 87 BPM | DIASTOLIC BLOOD PRESSURE: 90 MMHG

## 2024-05-16 DIAGNOSIS — R03.0 ELEVATED BLOOD PRESSURE READING: ICD-10-CM

## 2024-05-16 DIAGNOSIS — F98.8 ATTENTION DEFICIT DISORDER (ADD) WITHOUT HYPERACTIVITY: Primary | ICD-10-CM

## 2024-05-16 PROCEDURE — 99213 OFFICE O/P EST LOW 20 MIN: CPT | Performed by: FAMILY MEDICINE

## 2024-05-16 RX ORDER — LISDEXAMFETAMINE DIMESYLATE 50 MG/1
50 CAPSULE ORAL EVERY MORNING
Qty: 30 CAPSULE | Refills: 0 | Status: SHIPPED | OUTPATIENT
Start: 2024-05-16

## 2024-05-16 NOTE — ASSESSMENT & PLAN NOTE
Will continue with just the Vyvanse at this time and hold off on the methylphenidate.  He is having an employment change and may not be working lengthy hours after that.

## 2024-05-16 NOTE — ASSESSMENT & PLAN NOTE
His blood pressure remains a little borderline.  Will hold off at this time he will continue to monitor.  He will also continue to work on lifestyle changes of diet exercise and weight loss.

## 2024-05-16 NOTE — PROGRESS NOTES
Chief Complaint   Patient presents with    Follow-up     3 month follow up     Hypertension    ADD        Subjective     David Hernandez  has no past medical history on file.    Elevated blood pressure-he has been checking his blood pressure.  He states it has been a little high.  He has had a rather stressful weeks ago.  It is borderline here today at 139/90.    ADD-he does feel he is doing well with his Vyvanse.  On his long days he will take a methylphenidate.  Although he states lately when he takes it with he will get a headache.  He also does not think it is really adding any additional benefit when he does take it.        PHQ-2 Depression Screening  Little interest or pleasure in doing things?     Feeling down, depressed, or hopeless?     PHQ-2 Total Score     PHQ-9 Depression Screening  Little interest or pleasure in doing things?     Feeling down, depressed, or hopeless?     Trouble falling or staying asleep, or sleeping too much?     Feeling tired or having little energy?     Poor appetite or overeating?     Feeling bad about yourself - or that you are a failure or have let yourself or your family down?     Trouble concentrating on things, such as reading the newspaper or watching television?     Moving or speaking so slowly that other people could have noticed? Or the opposite - being so fidgety or restless that you have been moving around a lot more than usual?     Thoughts that you would be better off dead, or of hurting yourself in some way?     PHQ-9 Total Score     If you checked off any problems, how difficult have these problems made it for you to do your work, take care of things at home, or get along with other people?       No Known Allergies    Prior to Admission medications    Medication Sig Start Date End Date Taking? Authorizing Provider   lisdexamfetamine (Vyvanse) 50 MG capsule Take 1 capsule by mouth Every Morning 4/16/24  Yes Rolo Vance, DO   methylphenidate (RITALIN) 10 MG  "tablet Take 1 tablet at 4 PM as needed. 24  Yes Rolo Vance,         Patient Active Problem List   Diagnosis    Encounter for medical examination to establish care    Attention deficit disorder (ADD) without hyperactivity    Sore throat    Elevated blood pressure reading        Past Surgical History:   Procedure Laterality Date    CLAVICLE SURGERY      ELBOW PROCEDURE Right     NASAL SEPTUM SURGERY Bilateral        Social History     Socioeconomic History    Marital status:    Tobacco Use    Smoking status: Former     Current packs/day: 0.00     Average packs/day: 1 pack/day for 10.0 years (10.0 ttl pk-yrs)     Types: Cigarettes     Start date:      Quit date:      Years since quittin.3    Smokeless tobacco: Never   Vaping Use    Vaping status: Never Used   Substance and Sexual Activity    Alcohol use: Yes     Alcohol/week: 4.0 standard drinks of alcohol     Types: 4 Cans of beer per week    Drug use: Never       Family History   Problem Relation Age of Onset    Cancer Mother     Diabetes Father     Cancer Father     Cancer Maternal Grandfather     Cancer Paternal Grandfather        Family history, surgical history, past medical history, Allergies and meds reviewed with patient today and updated in [employer] EMR.     ROS:  Review of Systems   Constitutional:  Negative for appetite change, fatigue and unexpected weight loss.   Cardiovascular:  Negative for palpitations.   Neurological:  Positive for headache. Negative for tremors.   Psychiatric/Behavioral:  Negative for decreased concentration.        OBJECTIVE:  Vitals:    24 1624   BP: 139/90   BP Location: Left arm   Patient Position: Sitting   Pulse: 87   Temp: 97.6 °F (36.4 °C)   SpO2: 98%   Weight: 91.6 kg (202 lb)   Height: 170.2 cm (67\")     No results found.   Body mass index is 31.64 kg/m².  No LMP for male patient.    The ASCVD Risk score (Madalyn DK, et al., 2019) failed to calculate for the following reasons:    The " 2019 ASCVD risk score is only valid for ages 40 to 79     Physical Exam  Vitals and nursing note reviewed.   Constitutional:       General: He is not in acute distress.     Appearance: Normal appearance. He is normal weight.   HENT:      Head: Normocephalic.   Cardiovascular:      Rate and Rhythm: Normal rate and regular rhythm.      Heart sounds: No murmur heard.  Pulmonary:      Effort: Pulmonary effort is normal.      Breath sounds: Normal breath sounds. No wheezing or rhonchi.   Neurological:      Mental Status: He is alert and oriented to person, place, and time.   Psychiatric:         Mood and Affect: Mood normal.         Thought Content: Thought content normal.         Judgment: Judgment normal.         Procedures    No visits with results within 30 Day(s) from this visit.   Latest known visit with results is:   Office Visit on 06/27/2023   Component Date Value Ref Range Status    Rapid Strep A Screen 06/27/2023 Negative  Negative, VALID, INVALID, Not Performed Final    Internal Control 06/27/2023 Passed  Passed Final    Lot Number 06/27/2023 [id number]   Final    Expiration Date 06/27/2023 01/24/2025   Final       ASSESSMENT/ PLAN:    Diagnoses and all orders for this visit:    1. Attention deficit disorder (ADD) without hyperactivity (Primary)  Assessment & Plan:  Will continue with just the Vyvanse at this time and hold off on the methylphenidate.  He is having an employment change and may not be working lengthy hours after that.    Orders:  -     lisdexamfetamine (Vyvanse) 50 MG capsule; Take 1 capsule by mouth Every Morning  Dispense: 30 capsule; Refill: 0    2. Elevated blood pressure reading  Assessment & Plan:  His blood pressure remains a little borderline.  Will hold off at this time he will continue to monitor.  He will also continue to work on lifestyle changes of diet exercise and weight loss.                 Orders Placed Today:     New Medications Ordered This Visit   Medications    lisdexamfetamine  (Vyvanse) 50 MG capsule     Sig: Take 1 capsule by mouth Every Morning     Dispense:  30 capsule     Refill:  0        Management Plan:     An After Visit Summary was printed and given to the patient at discharge.    Follow-up: Return in about 3 months (around 8/16/2024) for Recheck.    Rolo Vance DO 5/16/2024 17:15 EDT  This note was electronically signed.

## 2024-06-15 DIAGNOSIS — F98.8 ATTENTION DEFICIT DISORDER (ADD) WITHOUT HYPERACTIVITY: ICD-10-CM

## 2024-06-17 RX ORDER — LISDEXAMFETAMINE DIMESYLATE 50 MG/1
50 CAPSULE ORAL EVERY MORNING
Qty: 30 CAPSULE | Refills: 0 | Status: SHIPPED | OUTPATIENT
Start: 2024-06-17

## 2024-07-15 DIAGNOSIS — F98.8 ATTENTION DEFICIT DISORDER (ADD) WITHOUT HYPERACTIVITY: ICD-10-CM

## 2024-07-15 RX ORDER — LISDEXAMFETAMINE DIMESYLATE 50 MG/1
50 CAPSULE ORAL EVERY MORNING
Qty: 30 CAPSULE | Refills: 0 | Status: SHIPPED | OUTPATIENT
Start: 2024-07-15

## 2024-08-05 DIAGNOSIS — F98.8 ATTENTION DEFICIT DISORDER (ADD) WITHOUT HYPERACTIVITY: ICD-10-CM

## 2024-08-05 RX ORDER — LISDEXAMFETAMINE DIMESYLATE 50 MG/1
50 CAPSULE ORAL EVERY MORNING
Qty: 30 CAPSULE | Refills: 0 | Status: SHIPPED | OUTPATIENT
Start: 2024-08-05

## 2024-08-23 ENCOUNTER — OFFICE VISIT (OUTPATIENT)
Dept: FAMILY MEDICINE CLINIC | Facility: CLINIC | Age: 34
End: 2024-08-23
Payer: COMMERCIAL

## 2024-08-23 VITALS
HEART RATE: 76 BPM | TEMPERATURE: 97 F | DIASTOLIC BLOOD PRESSURE: 88 MMHG | BODY MASS INDEX: 32.33 KG/M2 | SYSTOLIC BLOOD PRESSURE: 137 MMHG | HEIGHT: 67 IN | OXYGEN SATURATION: 100 % | WEIGHT: 206 LBS

## 2024-08-23 DIAGNOSIS — F98.8 ATTENTION DEFICIT DISORDER (ADD) WITHOUT HYPERACTIVITY: Primary | ICD-10-CM

## 2024-08-23 LAB
AMPHET+METHAMPHET UR QL: NEGATIVE
AMPHETAMINE INTERNAL CONTROL: ABNORMAL
AMPHETAMINES UR QL: NEGATIVE
BARBITURATE INTERNAL CONTROL: ABNORMAL
BARBITURATES UR QL SCN: NEGATIVE
BENZODIAZ UR QL SCN: NEGATIVE
BENZODIAZEPINE INTERNAL CONTROL: ABNORMAL
BUPRENORPHINE INTERNAL CONTROL: ABNORMAL
BUPRENORPHINE SERPL-MCNC: NEGATIVE NG/ML
CANNABINOIDS SERPL QL: POSITIVE
COCAINE INTERNAL CONTROL: ABNORMAL
COCAINE UR QL: NEGATIVE
EXPIRATION DATE: ABNORMAL
Lab: ABNORMAL
MDMA (ECSTASY) INTERNAL CONTROL: ABNORMAL
MDMA UR QL SCN: NEGATIVE
METHADONE INTERNAL CONTROL: ABNORMAL
METHADONE UR QL SCN: NEGATIVE
METHAMPHETAMINE INTERNAL CONTROL: ABNORMAL
MORPHINE INTERNAL CONTROL: ABNORMAL
MORPHINE/OPIATES SCREEN, URINE: NEGATIVE
OXYCODONE INTERNAL CONTROL: ABNORMAL
OXYCODONE UR QL SCN: NEGATIVE
PCP UR QL SCN: NEGATIVE
PHENCYCLIDINE INTERNAL CONTROL: ABNORMAL
THC INTERNAL CONTROL: ABNORMAL

## 2024-08-23 PROCEDURE — 80305 DRUG TEST PRSMV DIR OPT OBS: CPT | Performed by: FAMILY MEDICINE

## 2024-08-23 PROCEDURE — 99213 OFFICE O/P EST LOW 20 MIN: CPT | Performed by: FAMILY MEDICINE

## 2024-08-23 NOTE — PROGRESS NOTES
Chief Complaint   Patient presents with    ADHD        Subjective     David Hernandez  has no past medical history on file.    ADD-he is doing well with his current medication and dosage.  He feels that his focus and concentration and distractibility are good at this time.        PHQ-2 Depression Screening  Little interest or pleasure in doing things?     Feeling down, depressed, or hopeless?     PHQ-2 Total Score     PHQ-9 Depression Screening  Little interest or pleasure in doing things?     Feeling down, depressed, or hopeless?     Trouble falling or staying asleep, or sleeping too much?     Feeling tired or having little energy?     Poor appetite or overeating?     Feeling bad about yourself - or that you are a failure or have let yourself or your family down?     Trouble concentrating on things, such as reading the newspaper or watching television?     Moving or speaking so slowly that other people could have noticed? Or the opposite - being so fidgety or restless that you have been moving around a lot more than usual?     Thoughts that you would be better off dead, or of hurting yourself in some way?     PHQ-9 Total Score     If you checked off any problems, how difficult have these problems made it for you to do your work, take care of things at home, or get along with other people?       No Known Allergies    Prior to Admission medications    Medication Sig Start Date End Date Taking? Authorizing Provider   lisdexamfetamine (Vyvanse) 50 MG capsule Take 1 capsule by mouth Every Morning 8/5/24  Yes Rolo Vance, DO   methylphenidate (RITALIN) 10 MG tablet Take 1 tablet at 4 PM as needed.  Patient not taking: Reported on 8/23/2024 4/16/24   Rolo Vance, DO        Patient Active Problem List   Diagnosis    Encounter for medical examination to establish care    Attention deficit disorder (ADD) without hyperactivity    Sore throat    Elevated blood pressure reading        Past Surgical History:  "  Procedure Laterality Date    CLAVICLE SURGERY      ELBOW PROCEDURE Right     NASAL SEPTUM SURGERY Bilateral        Social History     Socioeconomic History    Marital status:    Tobacco Use    Smoking status: Former     Current packs/day: 0.00     Average packs/day: 1 pack/day for 10.0 years (10.0 ttl pk-yrs)     Types: Cigarettes     Start date:      Quit date:      Years since quittin.6    Smokeless tobacco: Never   Vaping Use    Vaping status: Never Used   Substance and Sexual Activity    Alcohol use: Yes     Alcohol/week: 4.0 standard drinks of alcohol     Types: 4 Cans of beer per week    Drug use: Never       Family History   Problem Relation Age of Onset    Cancer Mother     Diabetes Father     Cancer Father     Cancer Maternal Grandfather     Cancer Paternal Grandfather        Family history, surgical history, past medical history, Allergies and meds reviewed with patient today and updated in "[employer] EMR.     ROS:  Review of Systems   Constitutional:  Negative for appetite change and unexpected weight loss.   Cardiovascular:  Negative for palpitations.   Neurological:  Negative for tremors and headache.   Psychiatric/Behavioral:  Negative for decreased concentration. The patient is not nervous/anxious.        OBJECTIVE:  Vitals:    24 0818   BP: 137/88   BP Location: Right arm   Patient Position: Sitting   Cuff Size: Adult   Pulse: 76   Temp: 97 °F (36.1 °C)   SpO2: 100%   Weight: 93.4 kg (206 lb)   Height: 170.2 cm (67\")     No results found.   Body mass index is 32.26 kg/m².  No LMP for male patient.    The ASCVD Risk score (Vernon DK, et al., 2019) failed to calculate for the following reasons:    The 2019 ASCVD risk score is only valid for ages 40 to 79     Physical Exam  Vitals and nursing note reviewed.   Constitutional:       General: He is not in acute distress.     Appearance: Normal appearance. He is normal weight.   HENT:      Head: Normocephalic.   Cardiovascular:      " Rate and Rhythm: Normal rate and regular rhythm.      Heart sounds: Normal heart sounds. No murmur heard.  Pulmonary:      Effort: Pulmonary effort is normal.      Breath sounds: Normal breath sounds. No wheezing or rhonchi.   Neurological:      Mental Status: He is alert and oriented to person, place, and time.   Psychiatric:         Mood and Affect: Mood normal.         Thought Content: Thought content normal.         Judgment: Judgment normal.         Procedures    No visits with results within 30 Day(s) from this visit.   Latest known visit with results is:   Office Visit on 06/27/2023   Component Date Value Ref Range Status    Rapid Strep A Screen 06/27/2023 Negative  Negative, VALID, INVALID, Not Performed Final    Internal Control 06/27/2023 Passed  Passed Final    Lot Number 06/27/2023 [id number]   Final    Expiration Date 06/27/2023 01/24/2025   Final       ASSESSMENT/ PLAN:    Diagnoses and all orders for this visit:    1. Attention deficit disorder (ADD) without hyperactivity (Primary)  Assessment & Plan:  He is doing well with his current medication and dosage.  Will continue that without change.    Orders:  -     POC Medline 12 Panel Urine Drug Screen               Orders Placed Today:     No orders of the defined types were placed in this encounter.       Management Plan:     An After Visit Summary was printed and given to the patient at discharge.    Follow-up: Return in about 3 months (around 11/23/2024) for Recheck.    Rolo Vance DO 8/23/2024 08:38 EDT  This note was electronically signed.

## 2024-09-13 DIAGNOSIS — F98.8 ATTENTION DEFICIT DISORDER (ADD) WITHOUT HYPERACTIVITY: ICD-10-CM

## 2024-09-13 RX ORDER — LISDEXAMFETAMINE DIMESYLATE 50 MG/1
50 CAPSULE ORAL EVERY MORNING
Qty: 30 CAPSULE | Refills: 0 | Status: SHIPPED | OUTPATIENT
Start: 2024-09-13

## 2024-10-15 DIAGNOSIS — F98.8 ATTENTION DEFICIT DISORDER (ADD) WITHOUT HYPERACTIVITY: ICD-10-CM

## 2024-10-15 RX ORDER — LISDEXAMFETAMINE DIMESYLATE 50 MG/1
50 CAPSULE ORAL EVERY MORNING
Qty: 30 CAPSULE | Refills: 0 | Status: SHIPPED | OUTPATIENT
Start: 2024-10-15

## 2024-11-15 DIAGNOSIS — F98.8 ATTENTION DEFICIT DISORDER (ADD) WITHOUT HYPERACTIVITY: ICD-10-CM

## 2024-11-18 DIAGNOSIS — F98.8 ATTENTION DEFICIT DISORDER (ADD) WITHOUT HYPERACTIVITY: ICD-10-CM

## 2024-11-18 RX ORDER — LISDEXAMFETAMINE DIMESYLATE 50 MG/1
50 CAPSULE ORAL EVERY MORNING
Qty: 30 CAPSULE | Refills: 0 | OUTPATIENT
Start: 2024-11-18

## 2024-11-18 RX ORDER — LISDEXAMFETAMINE DIMESYLATE 50 MG/1
50 CAPSULE ORAL EVERY MORNING
Qty: 30 CAPSULE | Refills: 0 | Status: SHIPPED | OUTPATIENT
Start: 2024-11-18

## 2024-11-22 ENCOUNTER — OFFICE VISIT (OUTPATIENT)
Dept: FAMILY MEDICINE CLINIC | Facility: CLINIC | Age: 34
End: 2024-11-22
Payer: COMMERCIAL

## 2024-11-22 VITALS
HEIGHT: 67 IN | WEIGHT: 207 LBS | OXYGEN SATURATION: 98 % | HEART RATE: 86 BPM | BODY MASS INDEX: 32.49 KG/M2 | TEMPERATURE: 98.2 F | SYSTOLIC BLOOD PRESSURE: 135 MMHG | DIASTOLIC BLOOD PRESSURE: 88 MMHG

## 2024-11-22 DIAGNOSIS — F98.8 ATTENTION DEFICIT DISORDER (ADD) WITHOUT HYPERACTIVITY: Primary | ICD-10-CM

## 2024-11-22 DIAGNOSIS — Z23 NEED FOR INFLUENZA VACCINATION: ICD-10-CM

## 2024-11-22 NOTE — PROGRESS NOTES
Chief Complaint   Patient presents with    Follow-up     3 month ADD        Subjective     David Hernandez  has no past medical history on file.    ADD-he is doing well at this time with his medication.  He felt recently that he was crashing in the early afternoons.  He states he cut back his caffeine intake and made sure he was eating and that has since resolved.        PHQ-2 Depression Screening  Little interest or pleasure in doing things?     Feeling down, depressed, or hopeless?     PHQ-2 Total Score     PHQ-9 Depression Screening  Little interest or pleasure in doing things?     Feeling down, depressed, or hopeless?     Trouble falling or staying asleep, or sleeping too much?     Feeling tired or having little energy?     Poor appetite or overeating?     Feeling bad about yourself - or that you are a failure or have let yourself or your family down?     Trouble concentrating on things, such as reading the newspaper or watching television?     Moving or speaking so slowly that other people could have noticed? Or the opposite - being so fidgety or restless that you have been moving around a lot more than usual?     Thoughts that you would be better off dead, or of hurting yourself in some way?     PHQ-9 Total Score     If you checked off any problems, how difficult have these problems made it for you to do your work, take care of things at home, or get along with other people?       No Known Allergies    Prior to Admission medications    Medication Sig Start Date End Date Taking? Authorizing Provider   lisdexamfetamine (Vyvanse) 50 MG capsule Take 1 capsule by mouth Every Morning 11/18/24  Yes Rolo Vance, DO   methylphenidate (RITALIN) 10 MG tablet Take 1 tablet at 4 PM as needed.  Patient not taking: Reported on 11/22/2024 4/16/24 11/22/24  Rolo Vance, DO        Patient Active Problem List   Diagnosis    Encounter for medical examination to establish care    Attention deficit disorder  "(ADD) without hyperactivity    Sore throat    Elevated blood pressure reading        Past Surgical History:   Procedure Laterality Date    CLAVICLE SURGERY      ELBOW PROCEDURE Right     NASAL SEPTUM SURGERY Bilateral        Social History     Socioeconomic History    Marital status:    Tobacco Use    Smoking status: Former     Current packs/day: 0.00     Average packs/day: 1 pack/day for 10.0 years (10.0 ttl pk-yrs)     Types: Cigarettes     Start date:      Quit date:      Years since quittin.8    Smokeless tobacco: Never   Vaping Use    Vaping status: Never Used   Substance and Sexual Activity    Alcohol use: Yes     Alcohol/week: 4.0 standard drinks of alcohol     Types: 4 Cans of beer per week    Drug use: Never       Family History   Problem Relation Age of Onset    Cancer Mother     Diabetes Father     Cancer Father     Cancer Maternal Grandfather     Cancer Paternal Grandfather        Family history, surgical history, past medical history, Allergies and meds reviewed with patient today and updated in [email or web address] EMR.     ROS:  Review of Systems   Constitutional:  Negative for appetite change and unexpected weight loss.   Cardiovascular:  Negative for palpitations.   Neurological:  Negative for tremors and headache.   Psychiatric/Behavioral:  Negative for decreased concentration and sleep disturbance. The patient is not nervous/anxious.        OBJECTIVE:  Vitals:    24 0834   BP: 135/88   BP Location: Left arm   Patient Position: Sitting   Pulse: 86   Temp: 98.2 °F (36.8 °C)   SpO2: 98%   Weight: 93.9 kg (207 lb)   Height: 170.2 cm (67\")     No results found.   Body mass index is 32.42 kg/m².  No LMP for male patient.    The ASCVD Risk score ([place] DK, et al., 2019) failed to calculate for the following reasons:    The 2019 ASCVD risk score is only valid for ages 40 to 79     Physical Exam  Vitals and nursing note reviewed.   Constitutional:       General: He is not in acute distress.     " Appearance: Normal appearance. He is normal weight.   HENT:      Head: Normocephalic.   Cardiovascular:      Rate and Rhythm: Normal rate and regular rhythm.      Heart sounds: Normal heart sounds. No murmur heard.  Pulmonary:      Effort: Pulmonary effort is normal.      Breath sounds: Normal breath sounds. No wheezing or rhonchi.   Neurological:      Mental Status: He is alert and oriented to person, place, and time.   Psychiatric:         Mood and Affect: Mood normal.         Thought Content: Thought content normal.         Judgment: Judgment normal.         Procedures    No visits with results within 30 Day(s) from this visit.   Latest known visit with results is:   Office Visit on 08/23/2024   Component Date Value Ref Range Status    Amphetamine Screen, Urine 08/23/2024 Negative  Negative Final    AMP INTERNAL CONTROL 08/23/2024 Passed  Passed Final    Barbiturates Screen, Urine 08/23/2024 Negative  Negative Final    BARBITURATE INTERNAL CONTROL 08/23/2024 Passed  Passed Final    Buprenorphine, Screen, Urine 08/23/2024 Negative  Negative Final    BUPRENORPHINE INTERNAL CONTROL 08/23/2024 Passed  Passed Final    Benzodiazepine Screen, Urine 08/23/2024 Negative  Negative Final    BENZODIAZEPINE INTERNAL CONTROL 08/23/2024 Passed  Passed Final    Cocaine Screen, Urine 08/23/2024 Negative  Negative Final    COCAINE INTERNAL CONTROL 08/23/2024 Passed  Passed Final    MDMA (ECSTASY) 08/23/2024 Negative  Negative Final    MDMA (ECSTASY) INTERNAL CONTROL 08/23/2024 Passed  Passed Final    Methamphetamine, Ur 08/23/2024 Negative  Negative Final    METHAMPHETAMINE INTERNAL CONTROL 08/23/2024 Passed  Passed Final    Morphine/Opiates Screen, Urine 08/23/2024 Negative  Negative Final    MOR INTERNAL CONTROL 08/23/2024 Passed  Passed Final    Methadone Screen, Urine 08/23/2024 Negative  Negative Final    METHADONE INTERNAL CONTROL 08/23/2024 Passed  Passed Final    Oxycodone Screen, Urine 08/23/2024 Negative  Negative Final     OXYCODONE INTERNAL CONTROL 08/23/2024 Passed  Passed Final    Phencyclidine (PCP), Urine 08/23/2024 Negative  Negative Final    PHENCYCLIDINE INTERNAL CONTROL 08/23/2024 Passed  Passed Final    THC, Screen, Urine 08/23/2024 Positive (A)  Negative Final    THC INTERNAL CONTROL 08/23/2024 Passed  Passed Final    Lot Number 08/23/2024 [id number]   Final    Expiration Date 08/23/2024 11/12/2025   Final       ASSESSMENT/ PLAN:    Diagnoses and all orders for this visit:    1. Attention deficit disorder (ADD) without hyperactivity (Primary)  Assessment & Plan:  He is doing well with his current medication at this time we will continue it without change.      2. Need for influenza vaccination  -     Fluzone >6mos (1223-3262)               Orders Placed Today:     No orders of the defined types were placed in this encounter.       Management Plan:     An After Visit Summary was printed and given to the patient at discharge.    Follow-up: No follow-ups on file.    Rolo Vance DO 11/22/2024 08:46 EST  This note was electronically signed.

## 2024-12-16 ENCOUNTER — OFFICE VISIT (OUTPATIENT)
Dept: FAMILY MEDICINE CLINIC | Facility: CLINIC | Age: 34
End: 2024-12-16
Payer: COMMERCIAL

## 2024-12-16 VITALS
BODY MASS INDEX: 32.49 KG/M2 | WEIGHT: 207 LBS | HEIGHT: 67 IN | DIASTOLIC BLOOD PRESSURE: 87 MMHG | HEART RATE: 67 BPM | OXYGEN SATURATION: 98 % | TEMPERATURE: 97.4 F | SYSTOLIC BLOOD PRESSURE: 127 MMHG

## 2024-12-16 DIAGNOSIS — J40 BRONCHITIS: ICD-10-CM

## 2024-12-16 DIAGNOSIS — R05.9 COUGH, UNSPECIFIED TYPE: Primary | ICD-10-CM

## 2024-12-16 PROBLEM — E66.811 OBESITY (BMI 30.0-34.9): Status: ACTIVE | Noted: 2024-12-16

## 2024-12-16 LAB
EXPIRATION DATE: NORMAL
INTERNAL CONTROL: NORMAL
Lab: NORMAL
S PYO AG THROAT QL: NEGATIVE

## 2024-12-16 PROCEDURE — 87880 STREP A ASSAY W/OPTIC: CPT | Performed by: STUDENT IN AN ORGANIZED HEALTH CARE EDUCATION/TRAINING PROGRAM

## 2024-12-16 PROCEDURE — 99214 OFFICE O/P EST MOD 30 MIN: CPT | Performed by: STUDENT IN AN ORGANIZED HEALTH CARE EDUCATION/TRAINING PROGRAM

## 2024-12-16 RX ORDER — AZITHROMYCIN 250 MG/1
TABLET, FILM COATED ORAL
Qty: 6 TABLET | Refills: 0 | Status: SHIPPED | OUTPATIENT
Start: 2024-12-16

## 2024-12-16 NOTE — PROGRESS NOTES
"Chief Complaint  Cough (Been going on for 2 weeks and tested for Covid and it was neg pt refused Covid test today ) and Nasal Congestion    Subjective      David Hernandez is a 34 y.o. male who presents to [facility]     Same day visit:  Patient comes with cc of Cough (Been going on for 2 weeks and tested for Covid and it was neg pt refused Covid test today) and Nasal Congestion. Strep negative.       Objective   Vital Signs:   Vitals:    12/16/24 0910   BP: 127/87   Pulse: 67   Temp: 97.4 °F (36.3 °C)   TempSrc: Temporal   SpO2: 98%   Weight: 93.9 kg (207 lb)   Height: 170.2 cm (67.01\")     Body mass index is 32.41 kg/m².    Wt Readings from Last 3 Encounters:   12/16/24 93.9 kg (207 lb)   11/22/24 93.9 kg (207 lb)   08/23/24 93.4 kg (206 lb)     BP Readings from Last 3 Encounters:   12/16/24 127/87   11/22/24 135/88   08/23/24 137/88       Health Maintenance   Topic Date Due    ANNUAL PHYSICAL  Never done    COVID-19 Vaccine (3 - 2024-25 season) 09/01/2024    BMI FOLLOWUP  12/16/2025    TDAP/TD VACCINES (3 - Td or Tdap) 02/25/2027    HEPATITIS C SCREENING  Completed    INFLUENZA VACCINE  Completed    Pneumococcal Vaccine 0-64  Aged Out       Physical Exam  Vitals reviewed.   HENT:      Head: Normocephalic.      Mouth/Throat:      Mouth: Mucous membranes are moist.   Eyes:      Pupils: Pupils are equal, round, and reactive to light.   Cardiovascular:      Rate and Rhythm: Normal rate.   Pulmonary:      Breath sounds: Rhonchi present.   Abdominal:      General: Abdomen is flat.   Musculoskeletal:         General: Normal range of motion.      Cervical back: Normal range of motion.   Skin:     General: Skin is warm.      Capillary Refill: Capillary refill takes less than 2 seconds.   Neurological:      Mental Status: He is alert.            Assessment & Plan  Cough, unspecified type    Orders:    POCT rapid strep A    Bronchitis    Orders:    azithromycin (Zithromax Z-Shorty) 250 MG tablet; " Take 2 tablets by mouth on day 1, then 1 tablet daily on days 2-5  pt instructed to go to the ED if symptoms worsen.              I spent 20 minutes caring for David on this date of service. This time includes time spent by me in the following activities:preparing for the visit, reviewing tests, obtaining and/or reviewing a separately obtained history, performing a medically appropriate examination and/or evaluation, counseling and educating the patient/family/caregiver, ordering medications, tests, or procedures, referring and communicating with other health care professionals, documenting information in the medical record, independently interpreting results and communicating that information with the patient/family/caregiver, care coordination.    FOLLOW UP  Return if symptoms worsen or fail to improve.  Patient was given instructions and counseling regarding his condition or for health maintenance advice. Please see specific information pulled into the AVS if appropriate.       Jan Mooney MD  12/16/24  10:38 EST    CURRENT & DISCONTINUED MEDICATIONS  Current Outpatient Medications   Medication Instructions    azithromycin (Zithromax Z-Shorty) 250 MG tablet Take 2 tablets by mouth on day 1, then 1 tablet daily on days 2-5    lisdexamfetamine (VYVANSE) 50 mg, Oral, Every Morning       There are no discontinued medications.

## 2024-12-18 DIAGNOSIS — F98.8 ATTENTION DEFICIT DISORDER (ADD) WITHOUT HYPERACTIVITY: ICD-10-CM

## 2024-12-18 RX ORDER — LISDEXAMFETAMINE DIMESYLATE 50 MG/1
50 CAPSULE ORAL EVERY MORNING
Qty: 30 CAPSULE | Refills: 0 | Status: SHIPPED | OUTPATIENT
Start: 2024-12-18

## 2025-01-14 DIAGNOSIS — F98.8 ATTENTION DEFICIT DISORDER (ADD) WITHOUT HYPERACTIVITY: ICD-10-CM

## 2025-01-14 RX ORDER — LISDEXAMFETAMINE DIMESYLATE 50 MG/1
50 CAPSULE ORAL EVERY MORNING
Qty: 30 CAPSULE | Refills: 0 | Status: SHIPPED | OUTPATIENT
Start: 2025-01-14

## 2025-02-16 DIAGNOSIS — F98.8 ATTENTION DEFICIT DISORDER (ADD) WITHOUT HYPERACTIVITY: ICD-10-CM

## 2025-02-17 RX ORDER — LISDEXAMFETAMINE DIMESYLATE 50 MG/1
50 CAPSULE ORAL EVERY MORNING
Qty: 30 CAPSULE | Refills: 0 | Status: SHIPPED | OUTPATIENT
Start: 2025-02-17

## 2025-02-25 ENCOUNTER — OFFICE VISIT (OUTPATIENT)
Dept: FAMILY MEDICINE CLINIC | Facility: CLINIC | Age: 35
End: 2025-02-25

## 2025-02-25 VITALS
SYSTOLIC BLOOD PRESSURE: 139 MMHG | WEIGHT: 209 LBS | DIASTOLIC BLOOD PRESSURE: 89 MMHG | BODY MASS INDEX: 32.8 KG/M2 | OXYGEN SATURATION: 99 % | HEIGHT: 67 IN | TEMPERATURE: 97 F

## 2025-02-25 DIAGNOSIS — F98.8 ATTENTION DEFICIT DISORDER (ADD) WITHOUT HYPERACTIVITY: Primary | ICD-10-CM

## 2025-02-25 PROCEDURE — 99213 OFFICE O/P EST LOW 20 MIN: CPT | Performed by: FAMILY MEDICINE

## 2025-02-25 RX ORDER — METHYLPHENIDATE HYDROCHLORIDE 10 MG/1
TABLET ORAL
Qty: 30 TABLET | Refills: 0 | Status: SHIPPED | OUTPATIENT
Start: 2025-02-25

## 2025-02-25 NOTE — PROGRESS NOTES
Chief Complaint   Patient presents with    Follow-up     3 month ADD         Subjective     David Hernandez  has no past medical history on file.    ADD-he states overall he is doing well with his medication.  He states though there are many days that early in the afternoon that he feels that he gets below a therapeutic level and has increased difficulties with focus concentration.        PHQ-2 Depression Screening  Little interest or pleasure in doing things?     Feeling down, depressed, or hopeless?     PHQ-2 Total Score     PHQ-9 Depression Screening  Little interest or pleasure in doing things?     Feeling down, depressed, or hopeless?     Trouble falling or staying asleep, or sleeping too much?     Feeling tired or having little energy?     Poor appetite or overeating?     Feeling bad about yourself - or that you are a failure or have let yourself or your family down?     Trouble concentrating on things, such as reading the newspaper or watching television?     Moving or speaking so slowly that other people could have noticed? Or the opposite - being so fidgety or restless that you have been moving around a lot more than usual?     Thoughts that you would be better off dead, or of hurting yourself in some way?     PHQ-9 Total Score     If you checked off any problems, how difficult have these problems made it for you to do your work, take care of things at home, or get along with other people?       No Known Allergies    Prior to Admission medications    Medication Sig Start Date End Date Taking? Authorizing Provider   lisdexamfetamine (Vyvanse) 50 MG capsule Take 1 capsule by mouth Every Morning 2/17/25  Yes Rolo Vance,    azithromycin (Zithromax Z-Shorty) 250 MG tablet Take 2 tablets by mouth on day 1, then 1 tablet daily on days 2-5 12/16/24 2/25/25  Jan Mayorga MD        Patient Active Problem List   Diagnosis    Encounter for medical examination to establish care    Attention deficit  "disorder (ADD) without hyperactivity    Sore throat    Elevated blood pressure reading    Obesity (BMI 30.0-34.9)        Past Surgical History:   Procedure Laterality Date    CLAVICLE SURGERY      ELBOW PROCEDURE Right     NASAL SEPTUM SURGERY Bilateral        Social History     Socioeconomic History    Marital status:    Tobacco Use    Smoking status: Former     Current packs/day: 0.00     Average packs/day: 1 pack/day for 10.0 years (10.0 ttl pk-yrs)     Types: Cigarettes     Start date: 2005     Quit date: 2015     Years since quitting: 10.1    Smokeless tobacco: Never   Vaping Use    Vaping status: Never Used   Substance and Sexual Activity    Alcohol use: Yes     Alcohol/week: 4.0 standard drinks of alcohol     Types: 4 Cans of beer per week    Drug use: Never       Family History   Problem Relation Age of Onset    Cancer Mother     Diabetes Father     Cancer Father     Cancer Maternal Grandfather     Cancer Paternal Grandfather        Family history, surgical history, past medical history, Allergies and meds reviewed with patient today and updated in [employer] EMR.     ROS:  Review of Systems   Constitutional:  Negative for appetite change.   Cardiovascular:  Negative for palpitations.   Neurological:  Negative for headache.   Psychiatric/Behavioral:  Positive for decreased concentration.        OBJECTIVE:  Vitals:    02/25/25 1202   BP: 139/89   BP Location: Left arm   Patient Position: Sitting   Temp: 97 °F (36.1 °C)   SpO2: 99%   Weight: 94.8 kg (209 lb)   Height: 170.2 cm (67.01\")     No results found.   Body mass index is 32.72 kg/m².  No LMP for male patient.    The ASCVD Risk score ([place] DK, et al., 2019) failed to calculate for the following reasons:    The 2019 ASCVD risk score is only valid for ages 40 to 79     Physical Exam  Vitals and nursing note reviewed.   Constitutional:       General: He is not in acute distress.     Appearance: Normal appearance. He is normal weight.   HENT:      Head: " Normocephalic.   Cardiovascular:      Rate and Rhythm: Normal rate and regular rhythm.      Heart sounds: Normal heart sounds. No murmur heard.  Pulmonary:      Effort: Pulmonary effort is normal.      Breath sounds: Normal breath sounds. No wheezing or rhonchi.   Neurological:      Mental Status: He is alert and oriented to person, place, and time.   Psychiatric:         Mood and Affect: Mood normal.         Thought Content: Thought content normal.         Judgment: Judgment normal.         Procedures    No visits with results within 30 Day(s) from this visit.   Latest known visit with results is:   Office Visit on 12/16/2024   Component Date Value Ref Range Status    Rapid Strep A Screen 12/16/2024 Negative  Negative, VALID, INVALID, Not Performed Final    Internal Control 12/16/2024 Passed  Passed Final    Lot Number 12/16/2024 [id number]   Final    Expiration Date 12/16/2024 02/16/2026   Final       ASSESSMENT/ PLAN:    Diagnoses and all orders for this visit:    1. Attention deficit disorder (ADD) without hyperactivity (Primary)  Assessment & Plan:  Overall he is doing well to about early afternoon.  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  Will add on something short acting to take when needed in the afternoon.    Orders:  -     methylphenidate (Ritalin) 10 MG tablet; Take 1 tablet early afternoon about 2 to 3 PM as needed  Dispense: 30 tablet; Refill: 0               Orders Placed Today:     New Medications Ordered This Visit   Medications    methylphenidate (Ritalin) 10 MG tablet     Sig: Take 1 tablet early afternoon about 2 to 3 PM as needed     Dispense:  30 tablet     Refill:  0        Management Plan:     An After Visit Summary was printed and given to the patient at discharge.    Follow-up: Return in about 3 months (around 5/25/2025) for Recheck.    Rolo Vance,  2/25/2025 12:56 EST  This note was electronically signed.

## 2025-02-25 NOTE — ASSESSMENT & PLAN NOTE
Overall he is doing well to about early afternoon.  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  Will add on something short acting to take when needed in the afternoon.

## 2025-03-19 DIAGNOSIS — F98.8 ATTENTION DEFICIT DISORDER (ADD) WITHOUT HYPERACTIVITY: ICD-10-CM

## 2025-03-19 RX ORDER — LISDEXAMFETAMINE DIMESYLATE 50 MG/1
50 CAPSULE ORAL EVERY MORNING
Qty: 30 CAPSULE | Refills: 0 | Status: SHIPPED | OUTPATIENT
Start: 2025-03-19

## 2025-04-01 DIAGNOSIS — F98.8 ATTENTION DEFICIT DISORDER (ADD) WITHOUT HYPERACTIVITY: ICD-10-CM

## 2025-04-02 RX ORDER — METHYLPHENIDATE HYDROCHLORIDE 10 MG/1
TABLET ORAL
Qty: 30 TABLET | Refills: 0 | Status: SHIPPED | OUTPATIENT
Start: 2025-04-02

## 2025-04-19 DIAGNOSIS — F98.8 ATTENTION DEFICIT DISORDER (ADD) WITHOUT HYPERACTIVITY: ICD-10-CM

## 2025-04-21 RX ORDER — LISDEXAMFETAMINE DIMESYLATE 50 MG/1
50 CAPSULE ORAL EVERY MORNING
Qty: 30 CAPSULE | Refills: 0 | Status: SHIPPED | OUTPATIENT
Start: 2025-04-21

## 2025-05-12 DIAGNOSIS — F98.8 ATTENTION DEFICIT DISORDER (ADD) WITHOUT HYPERACTIVITY: ICD-10-CM

## 2025-05-12 RX ORDER — METHYLPHENIDATE HYDROCHLORIDE 10 MG/1
TABLET ORAL
Qty: 30 TABLET | Refills: 0 | Status: SHIPPED | OUTPATIENT
Start: 2025-05-12

## 2025-05-21 DIAGNOSIS — F98.8 ATTENTION DEFICIT DISORDER (ADD) WITHOUT HYPERACTIVITY: ICD-10-CM

## 2025-05-21 RX ORDER — LISDEXAMFETAMINE DIMESYLATE 50 MG/1
50 CAPSULE ORAL EVERY MORNING
Qty: 30 CAPSULE | Refills: 0 | Status: SHIPPED | OUTPATIENT
Start: 2025-05-21

## 2025-05-27 ENCOUNTER — OFFICE VISIT (OUTPATIENT)
Dept: FAMILY MEDICINE CLINIC | Facility: CLINIC | Age: 35
End: 2025-05-27
Payer: COMMERCIAL

## 2025-05-27 VITALS
BODY MASS INDEX: 32.62 KG/M2 | OXYGEN SATURATION: 98 % | HEIGHT: 67 IN | TEMPERATURE: 97.3 F | DIASTOLIC BLOOD PRESSURE: 94 MMHG | HEART RATE: 96 BPM | SYSTOLIC BLOOD PRESSURE: 142 MMHG | WEIGHT: 207.8 LBS

## 2025-05-27 DIAGNOSIS — R03.0 ELEVATED BLOOD PRESSURE READING: Primary | ICD-10-CM

## 2025-05-27 DIAGNOSIS — F98.8 ATTENTION DEFICIT DISORDER (ADD) WITHOUT HYPERACTIVITY: ICD-10-CM

## 2025-05-27 PROCEDURE — 99213 OFFICE O/P EST LOW 20 MIN: CPT | Performed by: FAMILY MEDICINE

## 2025-05-27 RX ORDER — DEXTROAMPHETAMINE SULFATE 10 MG/1
10 TABLET ORAL DAILY
Qty: 30 TABLET | Refills: 0 | Status: SHIPPED | OUTPATIENT
Start: 2025-05-27

## 2025-05-27 NOTE — ASSESSMENT & PLAN NOTE
His blood pressure is mildly elevated here today.  He states in the last couple weeks it has been a little elevated as well due to stress at work.  He will continue to monitor.  Will hold off at this time for adding meds

## 2025-05-27 NOTE — ASSESSMENT & PLAN NOTE
Overall he is doing well.  Will try changing his Ritalin to dextroamphetamine and see if he does better.

## 2025-05-27 NOTE — PROGRESS NOTES
Chief Complaint   Patient presents with    Follow-up    ADD        Subjective     David Hernandez  has no past medical history on file.    ADD-he states overall he is doing well with his attention focus and concentration.  He states his Vyvanse works well in the first part of the day.  The Ritalin does seem to help but not as much as he would anticipate compared to how he feels in the first half of the day with the Vyvanse        PHQ-2 Depression Screening  Little interest or pleasure in doing things?     Feeling down, depressed, or hopeless?     PHQ-2 Total Score     PHQ-9 Depression Screening  Little interest or pleasure in doing things?     Feeling down, depressed, or hopeless?     Trouble falling or staying asleep, or sleeping too much?     Feeling tired or having little energy?     Poor appetite or overeating?     Feeling bad about yourself - or that you are a failure or have let yourself or your family down?     Trouble concentrating on things, such as reading the newspaper or watching television?     Moving or speaking so slowly that other people could have noticed? Or the opposite - being so fidgety or restless that you have been moving around a lot more than usual?     Thoughts that you would be better off dead, or of hurting yourself in some way?     PHQ-9 Total Score     If you checked off any problems, how difficult have these problems made it for you to do your work, take care of things at home, or get along with other people?       No Known Allergies    Prior to Admission medications    Medication Sig Start Date End Date Taking? Authorizing Provider   lisdexamfetamine (Vyvanse) 50 MG capsule Take 1 capsule by mouth Every Morning 5/21/25  Yes Rolo Vance, DO   methylphenidate (Ritalin) 10 MG tablet Take 1 tablet early afternoon about 2 to 3 PM as needed 5/12/25  Yes Rolo Vance, DO        Patient Active Problem List   Diagnosis    Encounter for medical examination to establish care  "   Attention deficit disorder (ADD) without hyperactivity    Sore throat    Elevated blood pressure reading    Obesity (BMI 30.0-34.9)        Past Surgical History:   Procedure Laterality Date    CLAVICLE SURGERY      ELBOW PROCEDURE Right     NASAL SEPTUM SURGERY Bilateral        Social History     Socioeconomic History    Marital status:    Tobacco Use    Smoking status: Former     Current packs/day: 0.00     Average packs/day: 1 pack/day for 10.0 years (10.0 ttl pk-yrs)     Types: Cigarettes     Start date: 2005     Quit date: 2015     Years since quitting: 10.4    Smokeless tobacco: Never   Vaping Use    Vaping status: Never Used   Substance and Sexual Activity    Alcohol use: Yes     Alcohol/week: 4.0 standard drinks of alcohol     Types: 4 Cans of beer per week     Comment: occasionally    Drug use: Never       Family History   Problem Relation Age of Onset    Cancer Mother     Diabetes Father     Cancer Father     Cancer Maternal Grandfather     Cancer Paternal Grandfather        Family history, surgical history, past medical history, Allergies and meds reviewed with patient today and updated in [employer] EMR.     ROS:  Review of Systems   Constitutional:  Negative for appetite change, fatigue and unexpected weight loss.   Cardiovascular:  Negative for palpitations.   Neurological:  Negative for headache.   Psychiatric/Behavioral:  Negative for decreased concentration.        OBJECTIVE:  Vitals:    05/27/25 0817   BP: 142/94   Pulse: 96   Temp: 97.3 °F (36.3 °C)   TempSrc: Oral   SpO2: 98%   Weight: 94.3 kg (207 lb 12.8 oz)   Height: 170.2 cm (67.01\")     No results found.   Body mass index is 32.54 kg/m².  No LMP for male patient.    The ASCVD Risk score ([place] DK, et al., 2019) failed to calculate for the following reasons:    The 2019 ASCVD risk score is only valid for ages 40 to 79     Physical Exam  Vitals and nursing note reviewed.   Constitutional:       General: He is not in acute distress.     " Appearance: Normal appearance. He is normal weight.   HENT:      Head: Normocephalic.   Cardiovascular:      Rate and Rhythm: Normal rate and regular rhythm.      Heart sounds: Normal heart sounds. No murmur heard.  Pulmonary:      Effort: Pulmonary effort is normal.      Breath sounds: Normal breath sounds. No wheezing or rhonchi.   Neurological:      Mental Status: He is alert and oriented to person, place, and time.   Psychiatric:         Mood and Affect: Mood normal.         Thought Content: Thought content normal.         Judgment: Judgment normal.         Procedures    No visits with results within 30 Day(s) from this visit.   Latest known visit with results is:   Office Visit on 12/16/2024   Component Date Value Ref Range Status    Rapid Strep A Screen 12/16/2024 Negative  Negative, VALID, INVALID, Not Performed Final    Internal Control 12/16/2024 Passed  Passed Final    Lot Number 12/16/2024 [id number]   Final    Expiration Date 12/16/2024 02/16/2026   Final       ASSESSMENT/ PLAN:    Diagnoses and all orders for this visit:    1. Elevated blood pressure reading (Primary)  Assessment & Plan:  His blood pressure is mildly elevated here today.  He states in the last couple weeks it has been a little elevated as well due to stress at work.  He will continue to monitor.  Will hold off at this time for adding meds      2. Attention deficit disorder (ADD) without hyperactivity  Assessment & Plan:  Overall he is doing well.  Will try changing his Ritalin to dextroamphetamine and see if he does better.    Orders:  -     dextroamphetamine (DEXTROSTAT) 10 MG tablet; Take 1 tablet by mouth Daily.  Dispense: 30 tablet; Refill: 0               Orders Placed Today:     New Medications Ordered This Visit   Medications    dextroamphetamine (DEXTROSTAT) 10 MG tablet     Sig: Take 1 tablet by mouth Daily.     Dispense:  30 tablet     Refill:  0        Management Plan:     An After Visit Summary was printed and given to the  patient at discharge.    Follow-up: No follow-ups on file.    Rolo Vance DO 5/27/2025 08:40 EDT  This note was electronically signed.

## 2025-06-20 DIAGNOSIS — F98.8 ATTENTION DEFICIT DISORDER (ADD) WITHOUT HYPERACTIVITY: ICD-10-CM

## 2025-06-20 RX ORDER — LISDEXAMFETAMINE DIMESYLATE 50 MG/1
50 CAPSULE ORAL EVERY MORNING
Qty: 30 CAPSULE | Refills: 0 | Status: SHIPPED | OUTPATIENT
Start: 2025-06-20

## 2025-07-07 DIAGNOSIS — F98.8 ATTENTION DEFICIT DISORDER (ADD) WITHOUT HYPERACTIVITY: ICD-10-CM

## 2025-07-07 RX ORDER — DEXTROAMPHETAMINE SULFATE 10 MG/1
10 TABLET ORAL DAILY
Qty: 30 TABLET | Refills: 0 | Status: SHIPPED | OUTPATIENT
Start: 2025-07-07

## 2025-07-18 DIAGNOSIS — F98.8 ATTENTION DEFICIT DISORDER (ADD) WITHOUT HYPERACTIVITY: ICD-10-CM

## 2025-07-18 RX ORDER — LISDEXAMFETAMINE DIMESYLATE 50 MG/1
50 CAPSULE ORAL EVERY MORNING
Qty: 30 CAPSULE | Refills: 0 | Status: SHIPPED | OUTPATIENT
Start: 2025-07-18

## 2025-08-05 ENCOUNTER — OFFICE VISIT (OUTPATIENT)
Dept: FAMILY MEDICINE CLINIC | Facility: CLINIC | Age: 35
End: 2025-08-05
Payer: COMMERCIAL

## 2025-08-05 VITALS
TEMPERATURE: 98 F | BODY MASS INDEX: 31.08 KG/M2 | WEIGHT: 198 LBS | HEIGHT: 67 IN | OXYGEN SATURATION: 97 % | HEART RATE: 100 BPM | DIASTOLIC BLOOD PRESSURE: 83 MMHG | SYSTOLIC BLOOD PRESSURE: 135 MMHG

## 2025-08-05 DIAGNOSIS — R03.0 ELEVATED BLOOD PRESSURE READING: ICD-10-CM

## 2025-08-05 DIAGNOSIS — F98.8 ATTENTION DEFICIT DISORDER (ADD) WITHOUT HYPERACTIVITY: Primary | ICD-10-CM

## 2025-08-05 LAB
AMPHET+METHAMPHET UR QL: POSITIVE
AMPHETAMINE INTERNAL CONTROL: ABNORMAL
AMPHETAMINES UR QL: NEGATIVE
ANION GAP SERPL CALCULATED.3IONS-SCNC: 12.2 MMOL/L (ref 5–15)
BARBITURATE INTERNAL CONTROL: ABNORMAL
BARBITURATES UR QL SCN: NEGATIVE
BENZODIAZ UR QL SCN: NEGATIVE
BENZODIAZEPINE INTERNAL CONTROL: ABNORMAL
BUN SERPL-MCNC: 15 MG/DL (ref 6–20)
BUN/CREAT SERPL: 13.9 (ref 7–25)
BUPRENORPHINE INTERNAL CONTROL: ABNORMAL
BUPRENORPHINE SERPL-MCNC: NEGATIVE NG/ML
CALCIUM SPEC-SCNC: 9.9 MG/DL (ref 8.6–10.5)
CANNABINOIDS SERPL QL: NEGATIVE
CHLORIDE SERPL-SCNC: 105 MMOL/L (ref 98–107)
CHOLEST SERPL-MCNC: 161 MG/DL (ref 0–200)
CO2 SERPL-SCNC: 22.8 MMOL/L (ref 22–29)
COCAINE INTERNAL CONTROL: ABNORMAL
COCAINE UR QL: NEGATIVE
CREAT SERPL-MCNC: 1.08 MG/DL (ref 0.76–1.27)
EGFRCR SERPLBLD CKD-EPI 2021: 91.2 ML/MIN/1.73
EXPIRATION DATE: ABNORMAL
GLUCOSE SERPL-MCNC: 83 MG/DL (ref 65–99)
HDLC SERPL-MCNC: 47 MG/DL (ref 40–60)
LDLC SERPL CALC-MCNC: 105 MG/DL (ref 0–100)
LDLC/HDLC SERPL: 2.24 {RATIO}
Lab: ABNORMAL
MDMA (ECSTASY) INTERNAL CONTROL: ABNORMAL
MDMA UR QL SCN: NEGATIVE
METHADONE INTERNAL CONTROL: ABNORMAL
METHADONE UR QL SCN: NEGATIVE
METHAMPHETAMINE INTERNAL CONTROL: ABNORMAL
MORPHINE INTERNAL CONTROL: ABNORMAL
MORPHINE/OPIATES SCREEN, URINE: NEGATIVE
OXYCODONE INTERNAL CONTROL: ABNORMAL
OXYCODONE UR QL SCN: NEGATIVE
PCP UR QL SCN: NEGATIVE
PHENCYCLIDINE INTERNAL CONTROL: ABNORMAL
POTASSIUM SERPL-SCNC: 4.4 MMOL/L (ref 3.5–5.2)
SODIUM SERPL-SCNC: 140 MMOL/L (ref 136–145)
THC INTERNAL CONTROL: ABNORMAL
TRIGL SERPL-MCNC: 44 MG/DL (ref 0–150)
VLDLC SERPL-MCNC: 9 MG/DL (ref 5–40)

## 2025-08-05 PROCEDURE — 80061 LIPID PANEL: CPT | Performed by: FAMILY MEDICINE

## 2025-08-05 PROCEDURE — 80048 BASIC METABOLIC PNL TOTAL CA: CPT | Performed by: FAMILY MEDICINE

## 2025-08-14 DIAGNOSIS — F98.8 ATTENTION DEFICIT DISORDER (ADD) WITHOUT HYPERACTIVITY: ICD-10-CM

## 2025-08-15 RX ORDER — DEXTROAMPHETAMINE SULFATE 10 MG/1
10 TABLET ORAL DAILY
Qty: 30 TABLET | Refills: 0 | Status: SHIPPED | OUTPATIENT
Start: 2025-08-15

## 2025-08-19 DIAGNOSIS — F98.8 ATTENTION DEFICIT DISORDER (ADD) WITHOUT HYPERACTIVITY: ICD-10-CM

## 2025-08-20 RX ORDER — LISDEXAMFETAMINE DIMESYLATE 50 MG/1
50 CAPSULE ORAL EVERY MORNING
Qty: 30 CAPSULE | Refills: 0 | Status: SHIPPED | OUTPATIENT
Start: 2025-08-20